# Patient Record
Sex: MALE | Race: WHITE | Employment: FULL TIME | URBAN - METROPOLITAN AREA
[De-identification: names, ages, dates, MRNs, and addresses within clinical notes are randomized per-mention and may not be internally consistent; named-entity substitution may affect disease eponyms.]

---
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
## 2021-09-06 ENCOUNTER — HOSPITAL ENCOUNTER (EMERGENCY)
Age: 21
Discharge: HOME | End: 2021-09-06
Payer: COMMERCIAL

## 2021-09-06 VITALS
RESPIRATION RATE: 16 BRPM | OXYGEN SATURATION: 98 % | TEMPERATURE: 98.1 F | DIASTOLIC BLOOD PRESSURE: 98 MMHG | HEART RATE: 78 BPM | SYSTOLIC BLOOD PRESSURE: 157 MMHG

## 2021-09-06 DIAGNOSIS — S61.213A LACERATION OF LEFT MIDDLE FINGER WITHOUT FOREIGN BODY WITHOUT DAMAGE TO NAIL, INITIAL ENCOUNTER: Primary | ICD-10-CM

## 2021-09-06 PROCEDURE — 12001 RPR S/N/AX/GEN/TRNK 2.5CM/<: CPT

## 2021-09-06 PROCEDURE — 99282 EMERGENCY DEPT VISIT SF MDM: CPT

## 2021-09-06 PROCEDURE — 2500000003 HC RX 250 WO HCPCS: Performed by: PHYSICIAN ASSISTANT

## 2021-09-06 RX ORDER — LIDOCAINE HYDROCHLORIDE 10 MG/ML
5 INJECTION, SOLUTION EPIDURAL; INFILTRATION; INTRACAUDAL; PERINEURAL ONCE
Status: COMPLETED | OUTPATIENT
Start: 2021-09-06 | End: 2021-09-06

## 2021-09-06 RX ORDER — BACITRACIN ZINC AND POLYMYXIN B SULFATE 500; 1000 [USP'U]/G; [USP'U]/G
OINTMENT TOPICAL ONCE
Status: DISCONTINUED | OUTPATIENT
Start: 2021-09-06 | End: 2021-09-06 | Stop reason: HOSPADM

## 2021-09-06 RX ADMIN — LIDOCAINE HYDROCHLORIDE 5 ML: 10 INJECTION, SOLUTION EPIDURAL; INFILTRATION; INTRACAUDAL; PERINEURAL at 12:48

## 2021-09-06 ASSESSMENT — ENCOUNTER SYMPTOMS
BACK PAIN: 0
RESPIRATORY NEGATIVE: 1
GASTROINTESTINAL NEGATIVE: 1

## 2021-09-06 ASSESSMENT — PAIN SCALES - GENERAL: PAINLEVEL_OUTOF10: 6

## 2021-09-06 ASSESSMENT — PAIN DESCRIPTION - PAIN TYPE: TYPE: ACUTE PAIN

## 2021-09-06 ASSESSMENT — PAIN DESCRIPTION - LOCATION: LOCATION: FINGER (COMMENT WHICH ONE)

## 2021-09-06 ASSESSMENT — PAIN DESCRIPTION - ORIENTATION: ORIENTATION: LEFT

## 2021-09-06 NOTE — ED NOTES
Pt discharged to home. Pt given discharge instructions and verbalized understanding. Pt ambulatory at discharge and left without incident.       Sammy Mendez RN  09/06/21 7739

## 2021-09-06 NOTE — ED PROVIDER NOTES
[facility]          PHYSICIAN ASSISTANT NOTE       Date of evaluation: 9/6/2021    Chief Complaint     Laceration      History of Present Illness     Gerson Sapp is a 22 y.o. male who presents to the emergency department with a left middle finger laceration. The patient was at work cutting a lemon when he accidentally cut his left middle finger on a knife. He is right-handed dominant denies numbness or tingling of the extremity. He does have some throbbing that he rates a 5-6 out of 10. He is able to move through full range of motion without difficulty. He states his tetanus vaccination is up-to-date. Review of Systems     Review of Systems   Constitutional: Negative for chills and fever. HENT: Negative. Respiratory: Negative. Cardiovascular: Negative. Gastrointestinal: Negative. Genitourinary: Negative. Musculoskeletal: Negative for back pain and neck pain. Positive for left middle finger pain and injury   Skin: Positive for wound. Positive for a left middle finger laceration   Neurological: Negative. Negative for dizziness, weakness, light-headedness, numbness and headaches. All other systems reviewed and are negative. Past Medical, Surgical, Family, and Social History     He has a past medical history of Fractures. He has a past surgical history that includes [place] tooth extraction. His family history is not on file. He reports that he has never smoked. He has never used smokeless tobacco. He reports that he does not drink alcohol and does not use drugs.     Medications     Current Discharge Medication List      CONTINUE these medications which have NOT CHANGED    Details   ondansetron (ZOFRAN ODT) 4 MG disintegrating tablet Take 1 tablet by mouth every 8 hours as needed for Nausea or Vomiting  Qty: 10 tablet, Refills: 0      dicyclomine (BENTYL) 10 MG capsule Take 1 capsule by mouth 4 times daily as needed (abdominal pain)  Qty: 12 capsule, Refills: 0             Allergies     He is allergic to amoxicillin. Physical Exam     INITIAL VITALS: BP: (!) 157/98, Temp: 98.1 °F (36.7 °C), Pulse: 78, Resp: 16, SpO2: 98 %  Physical Exam  Vitals and nursing note reviewed. Constitutional:       General: He is not in acute distress. Appearance: He is well-developed. HENT:      Head: Normocephalic and atraumatic. Right Ear: External ear normal.      Left Ear: External ear normal.      Nose: Nose normal.   Eyes:      General:         Right eye: No discharge. Left eye: No discharge. Pulmonary:      Effort: Pulmonary effort is normal.   Musculoskeletal:         General: Tenderness and signs of injury present. Normal range of motion. Cervical back: Normal range of motion and neck supple. Comments: Distal pulses 2+ bilaterally of upper and lower extremities. He is moving all extremities without difficulty. On examination of the left upper extremity he has 2+ radial and ulnar pulses with intact radial, median and ulnar nerves to motor and sensory function. On examination of the left middle finger he has an symmetrically 1 cm laceration on the radial aspect of the distal phalanx that stops just at the nail. There is no injury through the nail or nailbed. He is able to fully flex and extend the digit without difficulty. Brisk capillary refill   Skin:     General: Skin is warm and dry. Capillary Refill: Capillary refill takes less than 2 seconds. Neurological:      General: No focal deficit present. Mental Status: He is alert and oriented to person, place, and time. Sensory: No sensory deficit. Deep Tendon Reflexes: Reflexes normal.      Comments: 5 out of 5 strength of bilateral upper and lower extremities. Intact sensation throughout.   He is ambulating without difficulty   Psychiatric:         Mood and Affect: Mood normal.         Behavior: Behavior normal.         Thought Content: bacitracin-polymyxin b (POLYSPORIN) ointment       CONSULTS:  None    MEDICAL DECISION MAKING / ASSESSMENT / PLAN     Melodie Taylor is a 22 y.o. male who presented to the emergency department with a left middle finger laceration. On examination he is neurovascularly intact. 5 out of 5 strength of bilateral upper and lower extremities. No foreign bodies were noted. The patient cut his finger with a kitchen knife and given that he is neurovascularly intact, has full range of motion I do not imaging necessary at this time. I do not appreciate any obvious foreign body. His tetanus vaccination is up-to-date. He had his anger anesthetized, had the wound irrigated and closed with sutures. He will be discharged home with wound care instructions. He is to follow-up with his primary care physician in 7 to 8 days for wound check and suture removal.  He is return to the emergency department for worsening symptoms or signs of infection    This patient was seen independently by the advanced practice provider    Clinical Impression     1.  Laceration of left middle finger without foreign body without damage to nail, initial encounter        Disposition     PATIENT REFERRED TO:  your PCP or PCP of your choice from list provided            DISCHARGE MEDICATIONS:  Current Discharge Medication List          DISPOSITION Decision To Discharge 09/06/2021 12:13:24 PM     SHRADDHA Robles  09/06/21 [place] [facility]  09/06/21 7497

## 2021-10-14 ENCOUNTER — HOSPITAL ENCOUNTER (EMERGENCY)
Age: 21
Discharge: HOME | End: 2021-10-14
Attending: EMERGENCY MEDICINE
Payer: COMMERCIAL

## 2021-10-14 VITALS
RESPIRATION RATE: 16 BRPM | WEIGHT: 225 LBS | TEMPERATURE: 98.5 F | SYSTOLIC BLOOD PRESSURE: 156 MMHG | BODY MASS INDEX: 33.33 KG/M2 | HEIGHT: 69 IN | HEART RATE: 76 BPM | OXYGEN SATURATION: 99 % | DIASTOLIC BLOOD PRESSURE: 93 MMHG

## 2021-10-14 DIAGNOSIS — Z20.2 POSSIBLE EXPOSURE TO STD: Primary | ICD-10-CM

## 2021-10-14 PROCEDURE — 86702 HIV-2 ANTIBODY: CPT

## 2021-10-14 PROCEDURE — 87390 HIV-1 AG IA: CPT

## 2021-10-14 PROCEDURE — 86701 HIV-1ANTIBODY: CPT

## 2021-10-14 PROCEDURE — 86780 TREPONEMA PALLIDUM: CPT

## 2021-10-14 PROCEDURE — 99282 EMERGENCY DEPT VISIT SF MDM: CPT

## 2021-10-14 NOTE — ED NOTES
Pt discharged with written instructions.  Pt verbalizes understanding and walked to lobby     [employer], RN  10/14/21 5832

## 2021-10-14 NOTE — ED PROVIDER NOTES
Date of evaluation: 10/14/2021    Chief Complaint   Exposure to STD (Female partner told him that she was being tested for HPV, possible warts. Pt states he doesn't have symptoms. )      Nursing Notes, Past Medical Hx, Past Surgical Hx, Social Hx, Allergies, and Family Hx were reviewed. History of Present Illness     William Cook is a 22 y.o. male who presents with concerns of an STD exposure. One of his sexual partners told him that she is being evaluated for HPV/genital warts. He had unprotected sex 1 week ago with this partner. He is currently not having any symptoms. He denies fever, skin lesions, swollen lymph nodes, oral lesions, penile pain, dysuria, hematuria, scrotal pain or swelling, abdominal pain, sore throat or flulike symptoms. Review of Systems     Review of Systems   All other systems reviewed and are negative. He denies fever, skin lesions, swollen lymph nodes, oral lesions, penile pain, dysuria, hematuria, scrotal pain or swelling, abdominal pain, sore throat or flulike symptoms. Past Medical, Surgical, Family, and Social History     He has a past medical history of Fractures. He has a past surgical history that includes [place] tooth extraction. His family history is not on file. He reports that he has never smoked. He has never used smokeless tobacco. He reports that he does not drink alcohol and does not use drugs. Medications     Discharge Medication List as of 10/14/2021  8:33 AM          Allergies     He is allergic to amoxicillin. Physical Exam     INITIAL VITALS: BP (!) 156/93   Pulse 76   Temp 98.5 °F (36.9 °C) (Oral)   Resp 16   Ht 5' 9\" (1.753 m)   Wt 225 lb (102.1 kg)   SpO2 99%   BMI 33.23 kg/m²    Physical Exam  Vitals and nursing note reviewed. Constitutional:       Appearance: Normal appearance. He is not ill-appearing, toxic-appearing or diaphoretic. HENT:      Head: Normocephalic and atraumatic.       Mouth/Throat:      Mouth: Mucous membranes are moist.   Eyes:      Conjunctiva/sclera: Conjunctivae normal.   Cardiovascular:      Rate and Rhythm: Normal rate and regular rhythm. Pulses: Normal pulses. Heart sounds: Normal heart sounds. Pulmonary:      Effort: Pulmonary effort is normal.      Breath sounds: Normal breath sounds. Abdominal:      General: There is no distension. Palpations: Abdomen is soft. Tenderness: There is no abdominal tenderness. Genitourinary:     Penis: Normal.       Testes: Normal.      Comments: No skin lesions, no scrotal or testicular tenderness, no penile discharge  Musculoskeletal:         General: Normal range of motion. Cervical back: Neck supple. Lymphadenopathy:      Cervical: No cervical adenopathy. Skin:     General: Skin is warm and dry. Capillary Refill: Capillary refill takes less than 2 seconds. Findings: No lesion or rash. Neurological:      General: No focal deficit present. Mental Status: He is alert and oriented to person, place, and time. Psychiatric:         Behavior: Behavior normal.         Diagnostic Results       RADIOLOGY:  No orders to display       LABS:   Labs Reviewed   C. TRACHOMATIS / N. GONORRHOEAE, DNA   HIV SCREEN   SYPHILIS ANTIBODY CASCADING REFLEX         VITALS:  BP: (!) 156/93, Temp: 98.5 °F (36.9 °C), Pulse: 76, Resp: 16     Procedures         ED Course     The patient was given the followingmedications:  No orders of the defined types were placed in this encounter. CONSULTS:  None    MEDICAL DECISION MAKING     Nicole Campo is a 22 y.o. male presenting with concerns of for exposure to HPV after having unprotected sex. He is currently asymptomatic. He has no skin lesions to suggest HPV infection. Safe sex practices were advised. He is agreeable to testing for gonorrhea, chlamydia, syphilis and HIV. I advised him that he will need repeat HIV testing given the timing of his presentation.     Discharge instructions including strict return precautions were given to the patient. All questions were addressed. The patient verbalizes understanding and is in agreement with the plan. Clinical Impression     1.  Possible exposure to STD        /Plan     PATIENT REFERRED TO:  Eddie Guadalupe MD  [address] [address]  [phone]            DISCHARGE MEDICATIONS:  Discharge Medication List as of 10/14/2021  8:33 AM          DISPOSITION Decision To Discharge 10/14/2021 08:04:54 AM        Nakia Torres MD  10/14/21 1618

## 2021-10-14 NOTE — Clinical Note
Katerina Fields was seen and treated in our emergency department on 10/14/2021. He may return to work on 10/14/2021. If you have any questions or concerns, please don't hesitate to call.       Sharlene Cleveland MD

## 2021-10-15 LAB
HIV AG/AB: NORMAL
HIV ANTIGEN: NORMAL
HIV-1 ANTIBODY: NORMAL
HIV-2 AB: NORMAL
TOTAL SYPHILLIS IGG/IGM: NORMAL

## 2022-02-27 ENCOUNTER — HOSPITAL ENCOUNTER (EMERGENCY)
Age: 22
Discharge: HOME | End: 2022-02-27
Attending: EMERGENCY MEDICINE
Payer: COMMERCIAL

## 2022-02-27 VITALS
OXYGEN SATURATION: 100 % | DIASTOLIC BLOOD PRESSURE: 89 MMHG | SYSTOLIC BLOOD PRESSURE: 115 MMHG | WEIGHT: 220 LBS | HEIGHT: 69 IN | BODY MASS INDEX: 32.58 KG/M2 | RESPIRATION RATE: 16 BRPM | TEMPERATURE: 98.2 F | HEART RATE: 62 BPM

## 2022-02-27 DIAGNOSIS — R51.9 ACUTE NONINTRACTABLE HEADACHE, UNSPECIFIED HEADACHE TYPE: Primary | ICD-10-CM

## 2022-02-27 PROCEDURE — 96374 THER/PROPH/DIAG INJ IV PUSH: CPT

## 2022-02-27 PROCEDURE — 99283 EMERGENCY DEPT VISIT LOW MDM: CPT

## 2022-02-27 PROCEDURE — 6360000002 HC RX W HCPCS: Performed by: EMERGENCY MEDICINE

## 2022-02-27 PROCEDURE — 2580000003 HC RX 258: Performed by: EMERGENCY MEDICINE

## 2022-02-27 PROCEDURE — 96375 TX/PRO/DX INJ NEW DRUG ADDON: CPT

## 2022-02-27 RX ORDER — DIPHENHYDRAMINE HYDROCHLORIDE 50 MG/ML
25 INJECTION INTRAMUSCULAR; INTRAVENOUS ONCE
Status: COMPLETED | OUTPATIENT
Start: 2022-02-27 | End: 2022-02-27

## 2022-02-27 RX ORDER — KETOROLAC TROMETHAMINE 30 MG/ML
15 INJECTION, SOLUTION INTRAMUSCULAR; INTRAVENOUS ONCE
Status: COMPLETED | OUTPATIENT
Start: 2022-02-27 | End: 2022-02-27

## 2022-02-27 RX ORDER — PROCHLORPERAZINE EDISYLATE 5 MG/ML
10 INJECTION INTRAMUSCULAR; INTRAVENOUS ONCE
Status: COMPLETED | OUTPATIENT
Start: 2022-02-27 | End: 2022-02-27

## 2022-02-27 RX ORDER — 0.9 % SODIUM CHLORIDE 0.9 %
1000 INTRAVENOUS SOLUTION INTRAVENOUS ONCE
Status: COMPLETED | OUTPATIENT
Start: 2022-02-27 | End: 2022-02-27

## 2022-02-27 RX ADMIN — PROCHLORPERAZINE EDISYLATE 10 MG: 5 INJECTION INTRAMUSCULAR; INTRAVENOUS at 12:12

## 2022-02-27 RX ADMIN — DIPHENHYDRAMINE HYDROCHLORIDE 25 MG: 50 INJECTION, SOLUTION INTRAMUSCULAR; INTRAVENOUS at 12:12

## 2022-02-27 RX ADMIN — SODIUM CHLORIDE 1000 ML: 9 INJECTION, SOLUTION INTRAVENOUS at 12:11

## 2022-02-27 RX ADMIN — KETOROLAC TROMETHAMINE 15 MG: 30 INJECTION, SOLUTION INTRAMUSCULAR at 12:12

## 2022-02-27 ASSESSMENT — PAIN DESCRIPTION - DESCRIPTORS: DESCRIPTORS: THROBBING

## 2022-02-27 ASSESSMENT — PAIN SCALES - GENERAL
PAINLEVEL_OUTOF10: 8
PAINLEVEL_OUTOF10: 8

## 2022-02-27 ASSESSMENT — PAIN DESCRIPTION - LOCATION: LOCATION: HEAD

## 2022-02-27 ASSESSMENT — PAIN - FUNCTIONAL ASSESSMENT: PAIN_FUNCTIONAL_ASSESSMENT: 0-10

## 2022-02-27 ASSESSMENT — PAIN DESCRIPTION - FREQUENCY: FREQUENCY: CONTINUOUS

## 2022-02-27 ASSESSMENT — PAIN DESCRIPTION - ORIENTATION: ORIENTATION: LEFT

## 2022-02-27 ASSESSMENT — PAIN DESCRIPTION - PAIN TYPE: TYPE: ACUTE PAIN

## 2022-03-01 NOTE — ED PROVIDER NOTES
[facility]  ED      CHIEF COMPLAINT  Headache (patient with left temple pain since Tuesday, uncontrolled with over the counter meds. Patient describes as throbbing, constant pain. Patient denies vision changes, numbness or tingling in any extremeties. Family hx of migraines.)       HISTORY OF PRESENT ILLNESS  Yessi Rodas is a 25 y.o. male  who presents to the ED complaining of headache. The patient states that he has had a headache since this past Tuesday. He states it feels like his prior headaches but has lasted longer than usual.  He states it was gradual onset, describes it on the left side of his head as a throbbing pain. He describes phonophobia. He states it improves when he wakes up in the morning but then worsens again at night. He has been taking over-the-counter medications without any relief which brought him in today. He denies vision changes, dizziness, or focal numbness or weakness. He denies any vomiting has had some nausea. He denies any recent injuries. Denies fever chills. No other complaints, modifying factors or associated symptoms. I have reviewed the following from the nursing documentation. Past Medical History:   Diagnosis Date    Fractures      Past Surgical History:   Procedure Laterality Date    WISDOM TOOTH EXTRACTION       No family history on file.   Social History     Socioeconomic History    Marital status: Single     Spouse name: Not on file    Number of children: Not on file    Years of education: Not on file    Highest education level: Not on file   Occupational History    Not on file   Tobacco Use    Smoking status: Never Smoker    Smokeless tobacco: Never Used   Vaping Use    Vaping Use: Never used   Substance and Sexual Activity    Alcohol use: No    Drug use: Yes     Frequency: 3.0 times per week     Types: Marijuana Veldon Bunting)    Sexual activity: Not on file   Other Topics Concern    Not on file   Social History Narrative    Not on file     Social Determinants of Health     Financial Resource Strain:     Difficulty of Paying Living Expenses: Not on file   Food Insecurity:     Worried About Running Out of Food in the Last Year: Not on file    Alex of Food in the Last Year: Not on file   Transportation Needs:     Lack of Transportation (Medical): Not on file    Lack of Transportation (Non-Medical): Not on file   Physical Activity:     Days of Exercise per Week: Not on file    Minutes of Exercise per Session: Not on file   Stress:     Feeling of Stress : Not on file   Social Connections:     Frequency of Communication with Friends and Family: Not on file    Frequency of Social Gatherings with Friends and Family: Not on file    Attends Buddhist Services: Not on file    Active Member of [address] [place] [address] [employer] or Organizations: Not on file    Attends Club or Organization Meetings: Not on file    Marital Status: Not on file   Intimate Partner Violence:     Fear of Current or Ex-Partner: Not on file    Emotionally Abused: Not on file    Physically Abused: Not on file    Sexually Abused: Not on file   Housing Stability:     Unable to Pay for Housing in the Last Year: Not on file    Number of [place] in the Last Year: Not on file    Unstable Housing in the Last Year: Not on file     No current facility-administered medications for this encounter. No current outpatient medications on file. Allergies   Allergen Reactions    Amoxicillin Rash       REVIEW OF SYSTEMS  10 systems reviewed, pertinent positives per HPI otherwise noted to be negative. PHYSICAL EXAM  /89   Pulse 62   Temp 98.2 °F (36.8 °C) (Oral)   Resp 16   Ht 5' 9\" (1.753 m)   Wt 220 lb (99.8 kg)   SpO2 100%   BMI 32.49 kg/m²    Physical exam:  General appearance: awake and cooperative. no distress. Non toxic appearing. Skin: Warm and dry. No rashes or lesions. HENT: Normocephalic. Atraumatic. Mucus membranes are moist.   Neck: supple. Normal ROM.  No temperature source Oral, resp. rate 16, height 5' 9\" (1.753 m), weight 220 lb (99.8 kg), SpO2 100 %. Patient was given scripts for the following medications. I counseled patient how to take these medications. There are no discharge medications for this patient. Follow-up with:  Ayaan Rodriguez MD  [address] [place] [address]  [phone]    Call in 1 day        DISCLAIMER: This chart was created using Dragon dictation software. Efforts were made by me to ensure accuracy, however some errors may be present due to limitations of this technology and occasionally words are not transcribed correctly.        Dominick [place]  03/01/22 6734

## 2022-03-27 ENCOUNTER — HOSPITAL ENCOUNTER (EMERGENCY)
Age: 22
Discharge: HOME | End: 2022-03-27
Attending: STUDENT IN AN ORGANIZED HEALTH CARE EDUCATION/TRAINING PROGRAM
Payer: COMMERCIAL

## 2022-03-27 ENCOUNTER — APPOINTMENT (OUTPATIENT)
Dept: GENERAL RADIOLOGY | Age: 22
End: 2022-03-27
Payer: COMMERCIAL

## 2022-03-27 VITALS
RESPIRATION RATE: 18 BRPM | DIASTOLIC BLOOD PRESSURE: 94 MMHG | HEART RATE: 66 BPM | WEIGHT: 220 LBS | SYSTOLIC BLOOD PRESSURE: 153 MMHG | TEMPERATURE: 97.9 F | OXYGEN SATURATION: 99 % | HEIGHT: 69 IN | BODY MASS INDEX: 32.58 KG/M2

## 2022-03-27 DIAGNOSIS — S61.219A LACERATION OF FINGER OF RIGHT HAND, INITIAL ENCOUNTER: Primary | ICD-10-CM

## 2022-03-27 PROCEDURE — 90715 TDAP VACCINE 7 YRS/> IM: CPT | Performed by: STUDENT IN AN ORGANIZED HEALTH CARE EDUCATION/TRAINING PROGRAM

## 2022-03-27 PROCEDURE — 12002 RPR S/N/AX/GEN/TRNK2.6-7.5CM: CPT

## 2022-03-27 PROCEDURE — 6360000002 HC RX W HCPCS: Performed by: STUDENT IN AN ORGANIZED HEALTH CARE EDUCATION/TRAINING PROGRAM

## 2022-03-27 PROCEDURE — 2500000003 HC RX 250 WO HCPCS: Performed by: STUDENT IN AN ORGANIZED HEALTH CARE EDUCATION/TRAINING PROGRAM

## 2022-03-27 PROCEDURE — 99282 EMERGENCY DEPT VISIT SF MDM: CPT

## 2022-03-27 PROCEDURE — 73130 X-RAY EXAM OF HAND: CPT

## 2022-03-27 PROCEDURE — 90471 IMMUNIZATION ADMIN: CPT | Performed by: STUDENT IN AN ORGANIZED HEALTH CARE EDUCATION/TRAINING PROGRAM

## 2022-03-27 RX ORDER — DOXYCYCLINE HYCLATE 100 MG
100 TABLET ORAL 2 TIMES DAILY
Qty: 10 TABLET | Refills: 0 | Status: SHIPPED | OUTPATIENT
Start: 2022-03-27 | End: 2022-04-01

## 2022-03-27 RX ADMIN — TETANUS TOXOID, REDUCED DIPHTHERIA TOXOID AND ACELLULAR PERTUSSIS VACCINE, ADSORBED 0.5 ML: 5; 2.5; 8; 8; 2.5 SUSPENSION INTRAMUSCULAR at 11:36

## 2022-03-27 RX ADMIN — LIDOCAINE HYDROCHLORIDE 5 ML: 10 INJECTION, SOLUTION EPIDURAL; INFILTRATION; INTRACAUDAL; PERINEURAL at 11:37

## 2022-03-27 ASSESSMENT — PAIN SCALES - GENERAL: PAINLEVEL_OUTOF10: 4

## 2022-03-27 NOTE — ED PROVIDER NOTES
[address] [facility]          ATTENDING PHYSICIAN NOTE       Date of evaluation: 3/27/2022    Chief Complaint     Laceration (right hand index finger-cut hand cutting bread about an hour ago)      History of Present Illness     Drew Dixon is a 25 y.o. male who presents chief complaint of a laceration to his right index finger. Patient states incident occurred approximately 45 minutes ago, states excellently comes up with a knife. He is unsure when his last tetanus was. Complains of a sharp pain over the aspect of that finger. Denies any weakness or numbness. He is able to fully flex and extend that finger. [place] cuts to any other area. Movement and palpation make his pain worse, rest seems to improve his pain. Review of Systems     REVIEW OF SYSTEMS  GENERAL: Negative for any fevers, chills, or weight loss. HEENT: Negative for any head trauma, neck trauma, neck stiffness, photophobia, phonophobia, sinusitis, rhinitis. CARDIAC: Negative for any chest pain, palpitations  PULMONARY: Negative for any shortness of breath, cough, wheezing  GASTROINTESTINAL: Negative for any abdominal pain, nausea, vomiting,   GENITOURINARY: Negative for any dysuria, hematuria, incontinence. SKIN: Per HPI  MSK: Negative for any joint pains, back pain  HEMATOLOGIC: Negative for any abnormal bruising, frequent infections or bleeding. NEUROLOGIC: Negative for any headache, dizziness, focal weakness  PSYCH: Negative for any agitation, confusion      Past Medical, Surgical, Family, and Social History     He has a past medical history of Fractures. He has a past surgical history that includes [place] tooth extraction. His family history is not on file. He reports that he has never smoked. He has never used smokeless tobacco. He reports current alcohol use. He reports current drug use. Frequency: 3.00 times per week. Drug: Marijuana Micah Sanchez).     Medications     Previous Medications    No medications on file       Allergies     He is allergic to amoxicillin. Physical Exam     INITIAL VITALS: BP: (!) 153/94, Temp: 97.9 °F (36.6 °C), Pulse: 66, Resp: 18, SpO2: 99 %   Physical Exam  Constitutional:       Appearance: Normal appearance. HENT:      Head: Normocephalic and atraumatic. Eyes:      Conjunctiva/sclera: Conjunctivae normal.      Pupils: Pupils are equal, round, and reactive to light. Cardiovascular:      Rate and Rhythm: Normal rate and regular rhythm. Pulses: Normal pulses. Pulmonary:      Effort: Pulmonary effort is normal.      Breath sounds: Normal breath sounds. Abdominal:      General: Abdomen is flat. Palpations: Abdomen is soft. Musculoskeletal:         General: No swelling. Normal range of motion. Cervical back: Normal range of motion and neck supple. Skin:     Comments: 3 and half centimeter laceration along the proximal phalanx on the palmar aspect of the first digit. Patient able to fully extend finger, able to fully flex both FDP and FDS. Sensation intact, brisk capillary refill. No other appreciable lacerations. No appreciable foreign bodies   Neurological:      General: No focal deficit present. Mental Status: He is alert. Mental status is at baseline. Psychiatric:         Mood and Affect: Mood normal.         Behavior: Behavior normal.         Diagnostic Results     EKG       RADIOLOGY:  XR HAND RIGHT (MIN 3 VIEWS)   Final Result      No acute fracture seen. LABS:   No results found for this visit on 03/27/22.     ED BEDSIDE ULTRASOUND:      RECENT VITALS:  BP: (!) 153/94,Temp: 97.9 °F (36.6 °C), Pulse: 66, Resp: 18, SpO2: 99 %     Procedures     Lac Repair    Date/Time: 3/27/2022 12:19 PM  Performed by: Fidelia Ludwig MD  Authorized by: Fidelia Ludwig MD     Consent:     Consent obtained:  Verbal    Risks discussed:  Infection, pain and poor cosmetic result    Alternatives discussed:  No treatment and delayed treatment  Anesthesia (see MAR for exact dosages): Anesthesia method:  Local infiltration    Local anesthetic:  Lidocaine 1% w/o epi  Laceration details:     Location:  Finger    Finger location:  R index finger    Length (cm):  4.5    Depth (mm):  0.5  Repair type:     Repair type:  Simple  Pre-procedure details:     Preparation:  Patient was prepped and draped in usual sterile fashion  Exploration:     Hemostasis achieved with:  Direct pressure    Wound extent: no fascia violation noted    Treatment:     Area cleansed with:  Saline and Shur-Clens    Amount of cleaning:  Extensive    Irrigation solution:  Sterile saline    Irrigation method:  Pressure wash  Skin repair:     Repair method:  Sutures    Suture size:  4-0    Suture material:  Prolene  Approximation:     Approximation:  Close  Post-procedure details:     Dressing:  Non-adherent dressing          ED Course     Nursing Notes, Past Medical Hx, Past Surgical Hx, Social Hx,Allergies, and Family Hx were reviewed. patient was given the following medications:  Orders Placed This Encounter   Medications    Tetanus-Diphth-Acell Pertussis (BOOSTRIX) injection 0.5 mL    lidocaine 1 % injection 5 mL       CONSULTS:  None    MEDICAL DECISIONMAKING / ASSESSMENT / Edwin Jaron is a 25 y.o. male presenting with chief complaint of a laceration of his right hand. Patient with approximately 4 and half centimeter laceration over the palmar aspect of the right proximal phalanx. Patient with full flexion and extension. Neurovascular intact, repaired as described above, x-ray without appreciable fracture or foreign body. Is updated. Wound thoroughly irrigated however patient is right-handed states knife was slightly dirty so will discharge with prophylactic antibiotics. Strict return precautions given. All questions answered properly. Patient agrees with plan moving forward    Clinical Impression     1.  Laceration of finger of right hand, initial encounter Disposition     PATIENT REFERRED TO:  Jena Cee MD  [address] [address]  [phone]    In 1 week  (Or to this ED) For suture removal      DISCHARGE MEDICATIONS:  New Prescriptions    DOXYCYCLINE HYCLATE (VIBRA-TABS) 100 MG TABLET    Take 1 tablet by mouth 2 times daily for 5 days       Cherrie Al MD  03/27/22 9434

## 2022-03-27 NOTE — ED NOTES
Patient prepared for and ready to be discharged. Patient discharged at this time in no acute distress after verbalizing understanding of discharge instructions. Patient left after receiving After Visit Summary instructions.       Mauro Rosenthal RN  03/27/22 9509

## 2022-06-27 ENCOUNTER — HOSPITAL ENCOUNTER (EMERGENCY)
Age: 22
Discharge: HOME | End: 2022-06-27
Attending: EMERGENCY MEDICINE
Payer: COMMERCIAL

## 2022-06-27 VITALS
BODY MASS INDEX: 32.58 KG/M2 | DIASTOLIC BLOOD PRESSURE: 73 MMHG | WEIGHT: 220 LBS | RESPIRATION RATE: 14 BRPM | SYSTOLIC BLOOD PRESSURE: 158 MMHG | OXYGEN SATURATION: 98 % | TEMPERATURE: 98.1 F | HEIGHT: 69 IN | HEART RATE: 81 BPM

## 2022-06-27 DIAGNOSIS — L30.9 DERMATITIS: Primary | ICD-10-CM

## 2022-06-27 PROCEDURE — 6370000000 HC RX 637 (ALT 250 FOR IP): Performed by: EMERGENCY MEDICINE

## 2022-06-27 PROCEDURE — 99283 EMERGENCY DEPT VISIT LOW MDM: CPT

## 2022-06-27 RX ORDER — CETIRIZINE HYDROCHLORIDE 10 MG/1
10 TABLET ORAL DAILY
Qty: 30 TABLET | Refills: 0 | Status: SHIPPED | OUTPATIENT
Start: 2022-06-27 | End: 2022-07-27

## 2022-06-27 RX ORDER — PREDNISONE 20 MG/1
TABLET ORAL
Qty: 18 TABLET | Refills: 0 | Status: SHIPPED | OUTPATIENT
Start: 2022-06-27 | End: 2022-07-07

## 2022-06-27 RX ORDER — DIPHENHYDRAMINE HCL 25 MG
25 TABLET ORAL ONCE
Status: COMPLETED | OUTPATIENT
Start: 2022-06-27 | End: 2022-06-27

## 2022-06-27 RX ORDER — PREDNISONE 20 MG/1
60 TABLET ORAL ONCE
Status: COMPLETED | OUTPATIENT
Start: 2022-06-27 | End: 2022-06-27

## 2022-06-27 RX ADMIN — PREDNISONE 60 MG: 20 TABLET ORAL at 02:40

## 2022-06-27 RX ADMIN — DIPHENHYDRAMINE HCL 25 MG: 25 TABLET ORAL at 02:41

## 2022-06-27 ASSESSMENT — PAIN - FUNCTIONAL ASSESSMENT: PAIN_FUNCTIONAL_ASSESSMENT: 0-10

## 2022-06-27 ASSESSMENT — PAIN DESCRIPTION - PAIN TYPE: TYPE: ACUTE PAIN

## 2022-06-27 ASSESSMENT — PAIN DESCRIPTION - DESCRIPTORS: DESCRIPTORS: DISCOMFORT

## 2022-06-27 ASSESSMENT — PAIN DESCRIPTION - LOCATION: LOCATION: GENERALIZED

## 2022-06-27 ASSESSMENT — PAIN SCALES - GENERAL: PAINLEVEL_OUTOF10: 7

## 2022-06-27 NOTE — ED NOTES
Discharge instructions explained by ED provider. Patient verbalized understanding and denies any other concerns or complaints at this time. Patient vital signs stable and no acute signs or symptoms of distress noted at discharge. Patient deemed clinically stable. Patient d/c home with family.      Esperanza Gerber RN  06/27/22 5216

## 2022-06-29 ASSESSMENT — ENCOUNTER SYMPTOMS
SORE THROAT: 0
WHEEZING: 0
SHORTNESS OF BREATH: 0
TROUBLE SWALLOWING: 0

## 2022-06-29 NOTE — ED PROVIDER NOTES
[facility]  ED  EMERGENCY DEPARTMENTENCOUNTER      Pt Name: Santi Leach  MRN: [id number]  [place] 2000  Date ofevaluation: 6/27/2022  Provider: Juventino Denise MD    CHIEF COMPLAINT       Chief Complaint   Patient presents with   Charl Riedel went camping friday now has poison ivy in MANY places         HISTORY OF PRESENT ILLNESS   (Location/Symptom, Timing/Onset,Context/Setting, Quality, Duration, Modifying Factors, Severity)  Note limiting factors. Santi Leach is a 25 y.o. male  who  has a past medical history of Fractures. who presents to the emergency department for evaluation of rash. Patient reports that he recently camping and thinks he may have come in contact with poison ivy. He has a pruritic erythematous rash all over his upper EXTR and lower extremities. Denies wheezing fevers or difficulties breathing. He has been using topical medications without improvement. Denies fevers. HPI    NursingNotes were reviewed. REVIEW OF SYSTEMS    (2-9 systems for level 4, 10 or more for level 5)     Review of Systems   Constitutional: Negative for fever. HENT: Negative for sore throat and trouble swallowing. Respiratory: Negative for shortness of breath and wheezing. Skin: Positive for rash. Except as noted above the remainder of the review of systems was reviewed and negative. PAST MEDICAL HISTORY     Past Medical History:   Diagnosis Date    Fractures          SURGICALHISTORY       Past Surgical History:   Procedure Laterality Date    WISDOM TOOTH EXTRACTION           CURRENT MEDICATIONS       Discharge Medication List as of 6/27/2022  2:35 AM               Amoxicillin    FAMILY HISTORY     History reviewed. No pertinent family history.        SOCIAL HISTORY       Social History     Socioeconomic History    Marital status: Single     Spouse name: None    Number of children: None    Years of education: None    Highest education level: None   Occupational History    None   Tobacco Use    Smoking status: Never Smoker    Smokeless tobacco: Never Used   Vaping Use    Vaping Use: Never used   Substance and Sexual Activity    Alcohol use: Yes     Comment: occ    Drug use: Yes     Frequency: 3.0 times per week     Types: Marijuana [place])    Sexual activity: None   Other Topics Concern    None   Social History Narrative    None     Social Determinants of Health     Financial Resource Strain:     Difficulty of Paying Living Expenses: Not on file   Food Insecurity:     Worried About Running Out of Food in the Last Year: Not on file    Alex of Food in the Last Year: Not on file   Transportation Needs:     Lack of Transportation (Medical): Not on file    Lack of Transportation (Non-Medical):  Not on file   Physical Activity:     Days of Exercise per Week: Not on file    Minutes of Exercise per Session: Not on file   Stress:     Feeling of Stress : Not on file   Social Connections:     Frequency of Communication with Friends and Family: Not on file    Frequency of Social Gatherings with Friends and Family: Not on file    Attends Muslim Services: Not on file    Active Member of [address] [place] [address] or Organizations: Not on file    Attends Club or Organization Meetings: Not on file    Marital Status: Not on file   Intimate Partner Violence:     Fear of Current or Ex-Partner: Not on file    Emotionally Abused: Not on file    Physically Abused: Not on file    Sexually Abused: Not on file   Housing Stability:     Unable to Pay for Housing in the Last Year: Not on file    Number of [place] in the Last Year: Not on file    Unstable Housing in the Last Year: Not on file       SCREENINGS    Helio Coma Scale  Eye Opening: Spontaneous  Best Verbal Response: Oriented  Best Motor Response: Obeys commands  Helio Coma Scale Score: 15        PHYSICAL EXAM    (up to 7 for level 4, 8 or more for level 5)     ED Triage Vitals [06/27/22 0144]   BP Temp Temp Source Heart Rate Resp SpO2 Height Weight   (!) 158/73 98.1 °F (36.7 °C) Oral 81 14 98 % 5' 9\" (1.753 m) 220 lb (99.8 kg)       Physical Exam  Vitals and nursing note reviewed. Constitutional:       General: He is not in acute distress. Appearance: He is well-developed. HENT:      Head: Normocephalic and atraumatic. Eyes:      Conjunctiva/sclera: Conjunctivae normal.   Neck:      Trachea: No tracheal deviation. Cardiovascular:      Rate and Rhythm: Normal rate and regular rhythm. Pulmonary:      Effort: Pulmonary effort is normal.      Breath sounds: Normal breath sounds. No wheezing or rales. Abdominal:      General: There is no distension. Palpations: Abdomen is soft. Tenderness: There is no abdominal tenderness. Musculoskeletal:         General: No deformity. Normal range of motion. Cervical back: Normal range of motion. Skin:     General: Skin is warm and dry. Findings: Erythema and rash present. Neurological:      Mental Status: He is alert and oriented to person, place, and time. RESULTS     EKG: All EKG's are interpreted by the Emergency Department Physician who either signs or Co-signsthis chart in the absence of a cardiologist.        RADIOLOGY:   Erendira Everts such as CT, Ultrasound and MRI are read by the radiologist. Plain radiographic images are visualized and preliminarily interpreted by the emergency physician with the below findings:      Interpretation per the Radiologist below, if available at the time ofthis note:    No orders to display         ED BEDSIDE ULTRASOUND:   Performed by ED Physician - none    LABS:  Labs Reviewed - No data to display    All other labs were within normal range or not returned as of this dictation.     EMERGENCY DEPARTMENT COURSE and DIFFERENTIAL DIAGNOSIS/MDM:   Vitals:    Vitals:    06/27/22 0144   BP: (!) 158/73   Pulse: 81   Resp: 14   Temp: 98.1 °F (36.7 °C)   TempSrc: Oral   SpO2: 98%   Weight: 220 lb (99.8 kg)   Height: 5' 9\" (1.753 m)       Patient was given thefollowing medications:  Medications   predniSONE (DELTASONE) tablet 60 mg (60 mg Oral Given 6/27/22 0240)   diphenhydrAMINE (BENADRYL) tablet 25 mg (25 mg Oral Given 6/27/22 0241)       ED COURSE & MEDICAL DECISION MAKING    Pertinent Labs & Imaging studies reviewed. (See chart for details)   -  Patient seen and evaluated in the emergency department. -  Triage and nursing notes reviewed and incorporated. -  Old chart records reviewed and incorporated. -  Differential diagnosis includes: Differential diagnosis: Vasculitis, bacterial skin infection, viral rash, systemic infectious rash, Anaphylaxis, Urticaria, other    -  Work-up included:  See above  -  ED treatment included: See above  -  Results discussed with patient. Patient presents for diffuse rash after going camping. On exam he does appear to have a dermatitis. Patient started on prednisone taper given antihistamines. Patient feels improved on reevaluation. Symptomatic treatment with expectant management discussed with the patient and they and/or family members present are amenable to treatment plan and outpatient follow-up. Strict return precautions were discussed with the patient and those present. They demonstrated understanding of when to return to the emergency department for new or worsening symptoms. .  The patient is agreeable with plan of care and disposition. REASSESSMENT          CRITICAL CARE TIME   Total Critical Care time was 0 minutes, excluding separately reportable procedures. There was a high probability of clinically significant/life threatening deterioration in the patient's condition which required my urgent intervention. CONSULTS:  None    PROCEDURES:  Unless otherwise noted below, none     Procedures    FINAL IMPRESSION      1.  Dermatitis          DISPOSITION/PLAN   DISPOSITION Decision To Discharge 06/27/2022 02:34:06 AM      PATIENT REFERREDTO:  Lisa Godinez MD  [id number]

## 2022-11-30 ENCOUNTER — APPOINTMENT (OUTPATIENT)
Dept: CT IMAGING | Age: 22
End: 2022-11-30
Payer: COMMERCIAL

## 2022-11-30 ENCOUNTER — HOSPITAL ENCOUNTER (EMERGENCY)
Age: 22
Discharge: HOME | End: 2022-11-30
Attending: STUDENT IN AN ORGANIZED HEALTH CARE EDUCATION/TRAINING PROGRAM
Payer: COMMERCIAL

## 2022-11-30 VITALS
HEIGHT: 69 IN | TEMPERATURE: 98 F | BODY MASS INDEX: 34.07 KG/M2 | SYSTOLIC BLOOD PRESSURE: 136 MMHG | RESPIRATION RATE: 20 BRPM | WEIGHT: 230 LBS | DIASTOLIC BLOOD PRESSURE: 62 MMHG | HEART RATE: 69 BPM | OXYGEN SATURATION: 98 %

## 2022-11-30 DIAGNOSIS — S39.012A STRAIN OF LUMBAR REGION, INITIAL ENCOUNTER: Primary | ICD-10-CM

## 2022-11-30 DIAGNOSIS — M43.16 SPONDYLOLISTHESIS OF LUMBAR REGION: ICD-10-CM

## 2022-11-30 DIAGNOSIS — M62.838 SPASM OF MUSCLE: ICD-10-CM

## 2022-11-30 PROCEDURE — 96372 THER/PROPH/DIAG INJ SC/IM: CPT

## 2022-11-30 PROCEDURE — 6370000000 HC RX 637 (ALT 250 FOR IP): Performed by: STUDENT IN AN ORGANIZED HEALTH CARE EDUCATION/TRAINING PROGRAM

## 2022-11-30 PROCEDURE — 6360000002 HC RX W HCPCS: Performed by: STUDENT IN AN ORGANIZED HEALTH CARE EDUCATION/TRAINING PROGRAM

## 2022-11-30 PROCEDURE — 72131 CT LUMBAR SPINE W/O DYE: CPT

## 2022-11-30 PROCEDURE — 99284 EMERGENCY DEPT VISIT MOD MDM: CPT

## 2022-11-30 RX ORDER — DIAZEPAM 5 MG/1
5 TABLET ORAL ONCE
Status: COMPLETED | OUTPATIENT
Start: 2022-11-30 | End: 2022-11-30

## 2022-11-30 RX ORDER — METHYLPREDNISOLONE 4 MG/1
TABLET ORAL
Qty: 1 KIT | Refills: 0 | Status: SHIPPED | OUTPATIENT
Start: 2022-11-30 | End: 2022-12-06

## 2022-11-30 RX ORDER — KETOROLAC TROMETHAMINE 30 MG/ML
15 INJECTION, SOLUTION INTRAMUSCULAR; INTRAVENOUS ONCE
Status: COMPLETED | OUTPATIENT
Start: 2022-11-30 | End: 2022-11-30

## 2022-11-30 RX ADMIN — KETOROLAC TROMETHAMINE 15 MG: 30 INJECTION, SOLUTION INTRAMUSCULAR; INTRAVENOUS at 09:38

## 2022-11-30 RX ADMIN — DIAZEPAM 5 MG: 5 TABLET ORAL at 09:38

## 2022-11-30 ASSESSMENT — PAIN DESCRIPTION - PAIN TYPE
TYPE: CHRONIC PAIN
TYPE: ACUTE PAIN

## 2022-11-30 ASSESSMENT — PAIN DESCRIPTION - LOCATION
LOCATION: BACK
LOCATION: BACK

## 2022-11-30 ASSESSMENT — PAIN DESCRIPTION - DESCRIPTORS: DESCRIPTORS: SHARP

## 2022-11-30 ASSESSMENT — PAIN - FUNCTIONAL ASSESSMENT: PAIN_FUNCTIONAL_ASSESSMENT: 0-10

## 2022-11-30 ASSESSMENT — PAIN DESCRIPTION - FREQUENCY: FREQUENCY: CONTINUOUS

## 2022-11-30 ASSESSMENT — PAIN SCALES - GENERAL
PAINLEVEL_OUTOF10: 9
PAINLEVEL_OUTOF10: 5

## 2022-11-30 NOTE — DISCHARGE INSTRUCTIONS
You were seen in the emergency department with back pain. We feel that this is an acute flare and likely related to your sciatic nerve. I am prescribing you a steroid taper which should help with the inflammation and pain. Please call the number provided to follow-up with pain management and sports medicine.   You can return to the emergency department at anytime with any new or concerning changes to your health

## 2022-11-30 NOTE — ED PROVIDER NOTES
ATTENDING PHYSICIAN NOTE       Date of evaluation: 11/30/2022    Chief Complaint     Back Pain (Pt reporting chronic back pain. States he has a fx in L5. Was seeing ortho and had an injection about a year ago. However reports the stopped PT due to work schedule and hasn't had a second injection. Pain sometimes radiates down left leg but reports it's not radiating today. )      History of Present Illness     Daniel Coffey is a 25 y.o. male who presents with acute on chronic back pain. States that symptoms started on Sunday. Denies any injury or trauma. Back pain is mostly lower left side and radiates down to his knee. He denies any numbness or tingling. Denies any saddle anesthesia. He tried taking Robaxin with no relief. Denies any recent fever, fatigue, congestion, cough, shortness of breath, chest pain, palpitations, nausea, vomiting, abdominal pain, dysuria, hematuria, change in urinary frequency, neck pain, neck stiffness, dizziness, syncope. Reports that he stopped physical therapy due to his work schedule. Reports history of epidural injection which provided relief. Review of Systems     Review of Systems   All other systems reviewed and are negative. Past Medical, Surgical, Family, and Social History     He has a past medical history of Fractures. He has a past surgical history that includes [place] tooth extraction. His family history is not on file. He reports that he has never smoked. He has never used smokeless tobacco. He reports current alcohol use. He reports current drug use. Frequency: 3.00 times per week. Drug: Marijuana Jyoti Ege). Medications     Discharge Medication List as of 11/30/2022 10:04 AM          Allergies     He is allergic to amoxicillin. Physical Exam     INITIAL VITALS: BP: (!) 144/73, Temp: 98 °F (36.7 °C), Heart Rate: 76, Resp: 20, SpO2: 98 %   Physical Exam  Vitals and nursing note reviewed. Constitutional:       General: He is not in acute distress. Appearance: He is not ill-appearing. HENT:      Head: Normocephalic and atraumatic. Right Ear: External ear normal.      Left Ear: External ear normal.      Nose: Nose normal. No congestion or rhinorrhea. Mouth/Throat:      Pharynx: Oropharynx is clear. No oropharyngeal exudate or posterior oropharyngeal erythema. Eyes:      Extraocular Movements: Extraocular movements intact. Conjunctiva/sclera: Conjunctivae normal.   Cardiovascular:      Rate and Rhythm: Normal rate and regular rhythm. Pulses: Normal pulses. Heart sounds: Normal heart sounds. No murmur heard. No friction rub. No gallop. Pulmonary:      Effort: Pulmonary effort is normal.      Breath sounds: Normal breath sounds. No wheezing or rhonchi. Chest:      Chest wall: No tenderness. Abdominal:      General: There is no distension. Palpations: Abdomen is soft. Tenderness: There is no abdominal tenderness. There is no right CVA tenderness, left CVA tenderness, guarding or rebound. Musculoskeletal:         General: Normal range of motion. Cervical back: Normal range of motion and neck supple. No rigidity. Right lower leg: No edema. Left lower leg: No edema. Comments: Paraspinal lumbar tenderness and muscle spasm, no midline tenderness, 5 out of 5 strength throughout upper and lower extremities, sensation to light touch intact, normal reflexes, pain with left-sided straight leg raise   Skin:     General: Skin is warm. Capillary Refill: Capillary refill takes less than 2 seconds. Coloration: Skin is not jaundiced. Findings: No bruising or erythema. Neurological:      General: No focal deficit present. Mental Status: He is alert and oriented to person, place, and time. Cranial Nerves: No cranial nerve deficit. Sensory: No sensory deficit. Motor: No weakness.       Gait: Gait normal.   Psychiatric:         Mood and Affect: Mood normal.         Behavior: Behavior normal.       Diagnostic Results         RADIOLOGY:  CT LUMBAR SPINE WO CONTRAST   Final Result   1. No acute finding of the lumbar spine. 2. 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 LABS:   No results found for this visit on 11/30/22. ED BEDSIDE ULTRASOUND:  No results found. RECENT VITALS:  BP: 136/62,Temp: 98 °F (36.7 °C), Heart Rate: 69, Resp: 20, SpO2: 98 %     Procedures         ED Course     Nursing Notes, Past Medical Hx, Past Surgical Hx, Social Hx,Allergies, and Family Hx were reviewed. patient was given the following medications:  Orders Placed This Encounter   Medications    ketorolac (TORADOL) injection 15 mg    diazePAM (VALIUM) tablet 5 mg    methylPREDNISolone (MEDROL DOSEPACK) 4 MG tablet     Sig: Take by mouth. Dispense:  1 kit     Refill:  0       CONSULTS:  None    MEDICAL DECISIONMAKING / ASSESSMENT / Ed Emerita is a 25 y.o. male who presents with acute on chronic lumbar back pain. He presented hypertensive, afebrile, heart rate of 76, respiratory rate of 20 and satting at 98% on room air. On exam he did have left-sided paraspinal tenderness with intact range of motion, strength and sensation. Given history and exam presentation appears secondary to underlying lumbar radiculopathy and muscle spasm. He does have history of L5 fracture therefore will obtain imaging to assess this or additional findings. He has no weakness or sensation changes concerning for cauda equina. He has no risk factors concerning for epidural abscess. I obtained imaging as noted below. I interpreted the imaging and note  CT lumbar spine with no acute abnormalities. He does have bilateral spondylolysis at L5 with mild spondylolisthesis at L5 and S1    Patient reported some improvement after Toradol and Valium. Will place patient on steroid taper for radicular symptoms. He will follow-up with his spine and pain management physician. All questions and concerns were addressed. Strict return precautions reviewed. Clinical Impression     1. Strain of lumbar region, initial encounter    2. Spasm of muscle    3.  Spondylolisthesis of lumbar region        Disposition     PATIENT REFERRED TO:  Armida Saleh MD  [address] [phone]    In 1 day  To schedule follow up    Aditi Roberto  [facility] [address]  [address]  [phone]          DISCHARGE MEDICATIONS:  Discharge Medication List as of 11/30/2022 10:04 AM        START taking these medications    Details   methylPREDNISolone (MEDROL DOSEPACK) 4 MG tablet Take by mouth., Disp-1 kit, R-0Normal             DISPOSITION Decision To Discharge 11/30/2022 09:51:36 AM          Raymond Nam MD  11/30/22 0813

## 2022-11-30 NOTE — ED NOTES
Patient discharged to lobby to wait for a ride. He was given Valium today and is aware of the effects. 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       Brian Gross RN  11/30/22 1017

## 2022-11-30 NOTE — Clinical Note
Shelby Villasenor was seen and treated in our emergency department on 11/30/2022. He may return to work on 12/05/2022. At work [employer] heavy objects due to his back injury. I also recommend that he is able to sit down and take breaks at work when needed or when pain increases. If you have any questions or concerns, please don't hesitate to call.       Jacqueline Cabello MD

## 2023-02-15 ENCOUNTER — APPOINTMENT (OUTPATIENT)
Dept: ULTRASOUND IMAGING | Age: 23
End: 2023-02-15
Payer: COMMERCIAL

## 2023-02-15 ENCOUNTER — APPOINTMENT (OUTPATIENT)
Dept: CT IMAGING | Age: 23
End: 2023-02-15
Payer: COMMERCIAL

## 2023-02-15 ENCOUNTER — HOSPITAL ENCOUNTER (EMERGENCY)
Age: 23
Discharge: HOME | End: 2023-02-15
Attending: EMERGENCY MEDICINE
Payer: COMMERCIAL

## 2023-02-15 VITALS
HEIGHT: 69 IN | DIASTOLIC BLOOD PRESSURE: 75 MMHG | TEMPERATURE: 97.5 F | BODY MASS INDEX: 35.55 KG/M2 | OXYGEN SATURATION: 98 % | HEART RATE: 92 BPM | WEIGHT: 240 LBS | RESPIRATION RATE: 18 BRPM | SYSTOLIC BLOOD PRESSURE: 144 MMHG

## 2023-02-15 DIAGNOSIS — R59.0 PELVIC LYMPHADENOPATHY: Primary | ICD-10-CM

## 2023-02-15 LAB
A/G RATIO: 1.4 (ref 1.1–2.2)
ALBUMIN SERPL-MCNC: 4.5 G/DL (ref 3.4–5)
ALP BLD-CCNC: 65 U/L (ref 40–129)
ALT SERPL-CCNC: 29 U/L (ref 10–40)
ANION GAP SERPL CALCULATED.3IONS-SCNC: 14 MMOL/L (ref 3–16)
AST SERPL-CCNC: 43 U/L (ref 15–37)
BASOPHILS ABSOLUTE: 0.2 K/UL (ref 0–0.2)
BASOPHILS RELATIVE PERCENT: 1.7 %
BILIRUB SERPL-MCNC: 0.5 MG/DL (ref 0–1)
BILIRUBIN URINE: ABNORMAL
BLOOD, URINE: NEGATIVE
BUN BLDV-MCNC: 10 MG/DL (ref 7–20)
CALCIUM SERPL-MCNC: 9.6 MG/DL (ref 8.3–10.6)
CHLORIDE BLD-SCNC: 100 MMOL/L (ref 99–110)
CLARITY: CLEAR
CO2: 23 MMOL/L (ref 21–32)
COLOR: YELLOW
CREAT SERPL-MCNC: 1 MG/DL (ref 0.9–1.3)
EOSINOPHILS ABSOLUTE: 0.1 K/UL (ref 0–0.6)
EOSINOPHILS RELATIVE PERCENT: 1 %
GFR SERPL CREATININE-BSD FRML MDRD: >60 ML/MIN/{1.73_M2}
GLUCOSE BLD-MCNC: 97 MG/DL (ref 70–99)
GLUCOSE URINE: NEGATIVE MG/DL
HCT VFR BLD CALC: 43.4 % (ref 40.5–52.5)
HEMOGLOBIN: 13.9 G/DL (ref 13.5–17.5)
KETONES, URINE: 15 MG/DL
LEUKOCYTE ESTERASE, URINE: NEGATIVE
LYMPHOCYTES ABSOLUTE: 1.6 K/UL (ref 1–5.1)
LYMPHOCYTES RELATIVE PERCENT: 16.5 %
MCH RBC QN AUTO: 25 PG (ref 26–34)
MCHC RBC AUTO-ENTMCNC: 32 G/DL (ref 31–36)
MCV RBC AUTO: 78.2 FL (ref 80–100)
MICROSCOPIC EXAMINATION: ABNORMAL
MONOCYTES ABSOLUTE: 1.2 K/UL (ref 0–1.3)
MONOCYTES RELATIVE PERCENT: 12.2 %
NEUTROPHILS ABSOLUTE: 6.8 K/UL (ref 1.7–7.7)
NEUTROPHILS RELATIVE PERCENT: 68.6 %
NITRITE, URINE: NEGATIVE
PDW BLD-RTO: 13.8 % (ref 12.4–15.4)
PH UA: 6 (ref 5–8)
PLATELET # BLD: 215 K/UL (ref 135–450)
PMV BLD AUTO: 8.9 FL (ref 5–10.5)
POTASSIUM REFLEX MAGNESIUM: 4.9 MMOL/L (ref 3.5–5.1)
PROTEIN UA: NEGATIVE MG/DL
RBC # BLD: 5.55 M/UL (ref 4.2–5.9)
SODIUM BLD-SCNC: 137 MMOL/L (ref 136–145)
SPECIFIC GRAVITY UA: >=1.03 (ref 1–1.03)
TOTAL PROTEIN: 7.7 G/DL (ref 6.4–8.2)
URINE REFLEX TO CULTURE: ABNORMAL
URINE TYPE: ABNORMAL
UROBILINOGEN, URINE: 0.2 E.U./DL
WBC # BLD: 10 K/UL (ref 4–11)

## 2023-02-15 PROCEDURE — 74177 CT ABD & PELVIS W/CONTRAST: CPT

## 2023-02-15 PROCEDURE — 85025 COMPLETE CBC W/AUTO DIFF WBC: CPT

## 2023-02-15 PROCEDURE — 80053 COMPREHEN METABOLIC PANEL: CPT

## 2023-02-15 PROCEDURE — 6360000004 HC RX CONTRAST MEDICATION: Performed by: EMERGENCY MEDICINE

## 2023-02-15 PROCEDURE — 93975 VASCULAR STUDY: CPT

## 2023-02-15 PROCEDURE — 81003 URINALYSIS AUTO W/O SCOPE: CPT

## 2023-02-15 PROCEDURE — 76870 US EXAM SCROTUM: CPT

## 2023-02-15 PROCEDURE — 99285 EMERGENCY DEPT VISIT HI MDM: CPT

## 2023-02-15 RX ADMIN — IOPAMIDOL 75 ML: 755 INJECTION, SOLUTION INTRAVENOUS at 15:20

## 2023-02-15 ASSESSMENT — PAIN DESCRIPTION - LOCATION: LOCATION: ABDOMEN;SCROTUM;LEG

## 2023-02-15 ASSESSMENT — PAIN - FUNCTIONAL ASSESSMENT
PAIN_FUNCTIONAL_ASSESSMENT: 0-10
PAIN_FUNCTIONAL_ASSESSMENT: ACTIVITIES ARE NOT PREVENTED

## 2023-02-15 ASSESSMENT — PAIN DESCRIPTION - ORIENTATION: ORIENTATION: LEFT

## 2023-02-15 ASSESSMENT — PAIN DESCRIPTION - PAIN TYPE: TYPE: ACUTE PAIN

## 2023-02-15 ASSESSMENT — PAIN SCALES - GENERAL: PAINLEVEL_OUTOF10: 10

## 2023-02-15 NOTE — Clinical Note
Christiano Mcconnell was seen and treated in our emergency department on 2/15/2023. He may return to work on 02/16/2023. Patient may remain seated as needed for work     If you have any questions or concerns, please don't hesitate to call.       Nikita Goff MD

## 2023-02-15 NOTE — Clinical Note
Andrés Vargas was seen and treated in our emergency department on 2/15/2023. He may return to work on 02/16/2023. Patient may remain seated as needed for work     If you have any questions or concerns, please don't hesitate to call.       Claudio Shields MD

## 2023-02-15 NOTE — Clinical Note
Wallace Nguyen was seen and treated in our emergency department on 2/15/2023. He may return to work on 02/16/2023. Patient may remain seated as needed for work     If you have any questions or concerns, please don't hesitate to call.       Mya Adkins MD

## 2023-02-15 NOTE — ED PROVIDER NOTES
[facility]  ED  EMERGENCY DEPARTMENT ENCOUNTER      Pt Name: Migue Conner  MRN: [id number]  [place] 2000  Date of evaluation: 2/15/2023  Provider: Regis Kehr, MD    CHIEF COMPLAINT       Chief Complaint   Patient presents with    Other     Pt presents to the ER complaining of pain in the upper left thigh with a palpable mass or \"knot\". Pt also complains of pain in his left testicle that goes into his abdomen. Pt states pain gets worse the more his is on his feet and pain radiates to the leg by the end of the day. Pt states pain in the scrotum is constant and worse when walking. HISTORY OF PRESENT ILLNESS   (Location/Symptom, Timing/Onset, Context/Setting, Quality, Duration, Modifying Factors, Severity)  Note limiting factors. Migue Conner is a 26 y.o. male with past medical history of no significant illness here today for a painful mass in his left thigh. Patient states for the last 6 days or so he has had a \"knot\" in his left proximal thigh. Notes it is tender to palpation. Gets larger as the day progresses. No overlying redness. Denies any trauma or injury. States the pain starts in his left thigh and will radiate into his testicle and abdomen. He states his testicle itself does not hurt. No dysuria, hematuria urethral discharge or drainage. No flank pain. No fevers or chills. No weight loss. No rashes appreciated. No numbness tingling or weakness in the left leg    HPI    Nursing Notes were reviewed. REVIEW OF SYSTEMS    (2-9 systems for level 4, 10 or more for level 5)     Review of Systems    Please see HPI for pertinent positive and negative review of system findings. A full 10 system ROS was performed and otherwise negative.         PAST MEDICAL HISTORY     Past Medical History:   Diagnosis Date    Fractures          SURGICAL HISTORY       Past Surgical History:   Procedure Laterality Date    WISDOM TOOTH EXTRACTION           CURRENT MEDICATIONS       There are no discharge medications for this patient. ALLERGIES     Amoxicillin    FAMILY HISTORY     No family history on file. SOCIAL HISTORY       Social History     Socioeconomic History    Marital status: Single   Tobacco Use    Smoking status: Never    Smokeless tobacco: Never   Vaping Use    Vaping Use: Never used   Substance and Sexual Activity    Alcohol use: Yes     Comment: occ    Drug use: Yes     Frequency: 3.0 times per week     Types: Marijuana (Weed)       SCREENINGS    [place] Coma Scale  Eye Opening: Spontaneous  Best Verbal Response: Oriented  Best Motor Response: Obeys commands  Helio Coma Scale Score: 15          PHYSICAL EXAM    (up to 7 for level 4, 8 or more for level 5)     ED Triage Vitals [02/15/23 1324]   BP Temp Temp Source Heart Rate Resp SpO2 Height Weight   (!) 142/75 97.5 °F (36.4 °C) Oral 96 18 98 % 5' 9\" (1.753 m) 240 lb (108.9 kg)       Physical Exam    General appearance:  Cooperative. No acute distress. Skin:  Warm. Dry. Eye:  Extraocular movements intact. Ears, nose, mouth and throat:  Oral mucosa moist,  Neck:  Trachea midline. Heart:  Regular rate and rhythm  Perfusion:  intact  Respiratory:  Respirations nonlabored. Lungs clear to auscultation bilaterally. Abdominal:   Non distended. Nontender  : Normal-appearing external male circumcised genitalia. Testicles nontender bilaterally with positive cremasteric reflex. Testicles are normal in shape, size and lie. No inguinal masses or appreciable lymphadenopathy. No inguinal wall defects. Neurological:  Alert and oriented x 3. Moves all extremities spontaneously  Musculoskeletal:   Normal ROM, no deformities. There is an approximate 2 to 3 cm discrete and tender mass in the left proximal superior medial thigh that is nonpulsatile and nonreducible. No overlying skin changes.           Psychiatric:  Normal mood      DIAGNOSTIC RESULTS       Labs Reviewed   CBC WITH AUTO DIFFERENTIAL - Abnormal; Notable for the following components:       Result Value    MCV 78.2 (*)     MCH 25.0 (*)     All other components within normal limits   COMPREHENSIVE METABOLIC PANEL W/ REFLEX TO MG FOR LOW K - Abnormal; Notable for the following components:    AST 43 (*)     All other components within normal limits   URINALYSIS WITH REFLEX TO CULTURE - Abnormal; Notable for the following components:    Bilirubin Urine SMALL (*)     Ketones, Urine 15 (*)     All other components within normal limits       Interpretation per the Radiologist below, if obtained/available at the time of this note:    [address]   Final Result   The testicles are unremarkable in appearance. US DUP ABD PEL RETRO SCROT COMPLETE   Preliminary Result   The testicles are unremarkable in appearance. CT ABDOMEN PELVIS W IV CONTRAST Additional Contrast? None   Preliminary Result   1. Prominent left inguinal lymphadenopathy with visualization mildly   prominent retroperitoneal/left pelvic lymph nodes. While   infectious/inflammatory processes are in the differential diagnosis, other   etiologies including neoplastic abnormality such as lymphoma also in the   differential diagnosis. 2.  No evidence of bowel obstruction, intraperitoneal free air, or abscess. No radiographic finding to suggest presence of appendicitis. All other labs/imaging were within normal range or not returned as of this dictation. EMERGENCY DEPARTMENT COURSE and DIFFERENTIAL DIAGNOSIS/MDM:   Vitals:    Vitals:    02/15/23 1324 02/15/23 1646   BP: (!) 142/75 (!) 144/75   Pulse: 96 92   Resp: 18 18   Temp: 97.5 °F (36.4 °C)    TempSrc: Oral    SpO2: 98% 98%   Weight: 240 lb (108.9 kg)    Height: 5' 9\" (1.753 m)        Differential Diagnosis: Lymphadenopathy, inguinal hernia, femoral hernia, aneurysm, soft tissue mass    Patient presents emergency department today with a mildly uncomfortable mass in his proximal thigh/femur region.   No obvious palpable inguinal abnormality. No obvious palpable defect. Appears to be nonpulsatile. No signs of overlying infection. States the pain radiates into his testicles at times but no difficulty urinating. On exam, testicles were normal without any tenderness or appreciable abnormality. Positive cremasteric reflex bilaterally. CT scan performed does show that this is likely due to enlarged lymphadenopathy with further lymphadenopathy extending into the pelvis. Given this, I did perform ultrasound to rule out obvious testicular masses or signs of malignancy. This was overall normal.  Normal blood flow. No overlying signs of infection. No signs of STD. Concern for possible underlying malignancy. Expressed this concern to the patient. He already has follow-up scheduled with his primary care physician early next week which he was encouraged to keep but will also be provided with outpatient oncology follow-up. May likely need outpatient biopsy. No indication for antibiotics at this time    Medications and Route:   Medications   iopamidol (ISOVUE-370) 76 % injection 75 mL (75 mLs IntraVENous Given 2/15/23 1520)       History From: Patient         Chronic Conditions: Noted in HPI    CONSULTS: (Who and What was discussed)  None            Disposition Considerations (Tests not ordered but considered, Shared Decision Making, Pt Expectation of Test or Tx.):     Edouard Sherman M.D., am the primary clinician of record. MDM      CONSULTS     None    Critical Care:   None    REASSESSMENT          PROCEDURE     Unless otherwise noted below, none     Procedures      FINAL IMPRESSION      1.  Pelvic lymphadenopathy            DISPOSITION/PLAN   DISPOSITION Decision To Discharge 02/15/2023 07:49:03 PM        PATIENT REFERRED TO:  Leandro Rose MD  [address] [place] [address]  [phone]    Schedule an appointment as soon as possible for a visit       Mitchell Huston MD  [address] [facility] [id number]  [phone]    Schedule an appointment as soon as possible for a visit   Please call to schedule an appointment with Hematology/Oncology as soon as possible    DISCHARGE MEDICATIONS:  There are no discharge medications for this patient. Controlled Substances Monitoring:     No flowsheet data found.     (Please note that portions of this note were completed with a voice recognition program.  Efforts were made to edit the dictations but occasionally words are mis-transcribed.)    Edna Vickers MD (electronically signed)  Attending Emergency Physician            Verner Crumble, MD  02/16/23 1822

## 2023-02-16 NOTE — DISCHARGE INSTRUCTIONS
It is very important for you to follow-up with your primary care physician and outpatient hematology/oncology to evaluate the cause of your pelvic lymphadenopathy.

## 2023-03-05 ENCOUNTER — HOSPITAL ENCOUNTER (EMERGENCY)
Age: 23
Discharge: HOME | End: 2023-03-05
Attending: EMERGENCY MEDICINE
Payer: COMMERCIAL

## 2023-03-05 ENCOUNTER — APPOINTMENT (OUTPATIENT)
Dept: CT IMAGING | Age: 23
End: 2023-03-05
Payer: COMMERCIAL

## 2023-03-05 VITALS
BODY MASS INDEX: 37.03 KG/M2 | HEART RATE: 73 BPM | TEMPERATURE: 97 F | WEIGHT: 250 LBS | HEIGHT: 69 IN | RESPIRATION RATE: 18 BRPM | SYSTOLIC BLOOD PRESSURE: 127 MMHG | OXYGEN SATURATION: 100 % | DIASTOLIC BLOOD PRESSURE: 67 MMHG

## 2023-03-05 DIAGNOSIS — R10.33 PERIUMBILICAL ABDOMINAL PAIN: Primary | ICD-10-CM

## 2023-03-05 LAB
A/G RATIO: 1.7 (ref 1.1–2.2)
ALBUMIN SERPL-MCNC: 4.8 G/DL (ref 3.4–5)
ALP BLD-CCNC: 64 U/L (ref 40–129)
ALT SERPL-CCNC: 30 U/L (ref 10–40)
ANION GAP SERPL CALCULATED.3IONS-SCNC: 11 MMOL/L (ref 3–16)
AST SERPL-CCNC: 27 U/L (ref 15–37)
BASOPHILS ABSOLUTE: 0.1 K/UL (ref 0–0.2)
BASOPHILS RELATIVE PERCENT: 0.7 %
BILIRUB SERPL-MCNC: 0.5 MG/DL (ref 0–1)
BILIRUBIN URINE: NEGATIVE
BLOOD, URINE: NEGATIVE
BUN BLDV-MCNC: 15 MG/DL (ref 7–20)
CALCIUM SERPL-MCNC: 9.7 MG/DL (ref 8.3–10.6)
CHLORIDE BLD-SCNC: 98 MMOL/L (ref 99–110)
CLARITY: CLEAR
CO2: 25 MMOL/L (ref 21–32)
COLOR: YELLOW
CREAT SERPL-MCNC: 1 MG/DL (ref 0.9–1.3)
EOSINOPHILS ABSOLUTE: 0.3 K/UL (ref 0–0.6)
EOSINOPHILS RELATIVE PERCENT: 2 %
GFR SERPL CREATININE-BSD FRML MDRD: >60 ML/MIN/{1.73_M2}
GLUCOSE BLD-MCNC: 102 MG/DL (ref 70–99)
GLUCOSE URINE: NEGATIVE MG/DL
HCT VFR BLD CALC: 47.3 % (ref 40.5–52.5)
HEMOGLOBIN: 15.5 G/DL (ref 13.5–17.5)
KETONES, URINE: NEGATIVE MG/DL
LEUKOCYTE ESTERASE, URINE: NEGATIVE
LIPASE: 37 U/L (ref 13–60)
LYMPHOCYTES ABSOLUTE: 3.5 K/UL (ref 1–5.1)
LYMPHOCYTES RELATIVE PERCENT: 23.3 %
MCH RBC QN AUTO: 25.3 PG (ref 26–34)
MCHC RBC AUTO-ENTMCNC: 32.8 G/DL (ref 31–36)
MCV RBC AUTO: 77.2 FL (ref 80–100)
MICROSCOPIC EXAMINATION: NORMAL
MONOCYTES ABSOLUTE: 1.5 K/UL (ref 0–1.3)
MONOCYTES RELATIVE PERCENT: 10 %
NEUTROPHILS ABSOLUTE: 9.5 K/UL (ref 1.7–7.7)
NEUTROPHILS RELATIVE PERCENT: 64 %
NITRITE, URINE: NEGATIVE
PDW BLD-RTO: 14.7 % (ref 12.4–15.4)
PH UA: 6 (ref 5–8)
PLATELET # BLD: 276 K/UL (ref 135–450)
PMV BLD AUTO: 8.7 FL (ref 5–10.5)
POTASSIUM REFLEX MAGNESIUM: 4.6 MMOL/L (ref 3.5–5.1)
PROTEIN UA: NEGATIVE MG/DL
RBC # BLD: 6.12 M/UL (ref 4.2–5.9)
SODIUM BLD-SCNC: 134 MMOL/L (ref 136–145)
SPECIFIC GRAVITY UA: 1.01 (ref 1–1.03)
TOTAL PROTEIN: 7.6 G/DL (ref 6.4–8.2)
URINE REFLEX TO CULTURE: NORMAL
URINE TYPE: NORMAL
UROBILINOGEN, URINE: 0.2 E.U./DL
WBC # BLD: 14.8 K/UL (ref 4–11)

## 2023-03-05 PROCEDURE — 83690 ASSAY OF LIPASE: CPT

## 2023-03-05 PROCEDURE — 96374 THER/PROPH/DIAG INJ IV PUSH: CPT

## 2023-03-05 PROCEDURE — 6360000004 HC RX CONTRAST MEDICATION: Performed by: PHYSICIAN ASSISTANT

## 2023-03-05 PROCEDURE — 2580000003 HC RX 258: Performed by: PHYSICIAN ASSISTANT

## 2023-03-05 PROCEDURE — 85025 COMPLETE CBC W/AUTO DIFF WBC: CPT

## 2023-03-05 PROCEDURE — 80053 COMPREHEN METABOLIC PANEL: CPT

## 2023-03-05 PROCEDURE — 99285 EMERGENCY DEPT VISIT HI MDM: CPT

## 2023-03-05 PROCEDURE — 6360000002 HC RX W HCPCS: Performed by: PHYSICIAN ASSISTANT

## 2023-03-05 PROCEDURE — 81003 URINALYSIS AUTO W/O SCOPE: CPT

## 2023-03-05 PROCEDURE — 74177 CT ABD & PELVIS W/CONTRAST: CPT

## 2023-03-05 RX ORDER — KETOROLAC TROMETHAMINE 30 MG/ML
15 INJECTION, SOLUTION INTRAMUSCULAR; INTRAVENOUS ONCE
Status: COMPLETED | OUTPATIENT
Start: 2023-03-05 | End: 2023-03-05

## 2023-03-05 RX ORDER — SUCRALFATE 1 G/1
1 TABLET ORAL 4 TIMES DAILY
Qty: 60 TABLET | Refills: 0 | Status: SHIPPED | OUTPATIENT
Start: 2023-03-05

## 2023-03-05 RX ORDER — OMEPRAZOLE 40 MG/1
40 CAPSULE, DELAYED RELEASE ORAL
Qty: 30 CAPSULE | Refills: 0 | Status: SHIPPED | OUTPATIENT
Start: 2023-03-05

## 2023-03-05 RX ORDER — 0.9 % SODIUM CHLORIDE 0.9 %
1000 INTRAVENOUS SOLUTION INTRAVENOUS ONCE
Status: COMPLETED | OUTPATIENT
Start: 2023-03-05 | End: 2023-03-05

## 2023-03-05 RX ADMIN — SODIUM CHLORIDE 1000 ML: 9 INJECTION, SOLUTION INTRAVENOUS at 09:03

## 2023-03-05 RX ADMIN — KETOROLAC TROMETHAMINE 15 MG: 30 INJECTION, SOLUTION INTRAMUSCULAR; INTRAVENOUS at 09:02

## 2023-03-05 RX ADMIN — IOPAMIDOL 75 ML: 755 INJECTION, SOLUTION INTRAVENOUS at 09:30

## 2023-03-05 ASSESSMENT — PAIN SCALES - GENERAL
PAINLEVEL_OUTOF10: 0
PAINLEVEL_OUTOF10: 9
PAINLEVEL_OUTOF10: 9

## 2023-03-05 ASSESSMENT — PAIN - FUNCTIONAL ASSESSMENT
PAIN_FUNCTIONAL_ASSESSMENT: 0-10
PAIN_FUNCTIONAL_ASSESSMENT: 0-10

## 2023-03-05 NOTE — ED NOTES
Discharge paperwork given to and reviewed with pt. Pt verbalized understanding and all questions answered. Pt encouraged to return if having worsening symptoms or new symptoms discussed in discharge paperwork.  Pt to follow up with PCP  Rx x 2 given and medications reviewed with pt.   IV removed with catheter intact. Pt in NAD, RR even and unlabored. Pt off unit ambulatory      Laurel Ly RN  03/05/23 5083

## 2023-03-05 NOTE — ED PROVIDER NOTES
[facility]  ED  EMERGENCY DEPARTMENT ENCOUNTER        Pt Name: Olinda Madrigal  MRN: [id number]  [place] 2000  Date of evaluation: 3/5/2023  Provider: Geronimo Hurst PA-C  PCP: Tina Escobar MD  Note Started: 8:50 AM EST 3/5/23      LEO. I have evaluated this patient. My supervising physician was available for consultation. CHIEF COMPLAINT       Chief Complaint   Patient presents with    Abdominal Pain     Generalized abd pain since Thursday. Pt denies n/v/d. Last BM this morning       HISTORY OF PRESENT ILLNESS: 1 or more Elements     History From: patient  Limitations to history : None    Olinda Madrigal is a 26 y.o. male who presents to the emergency department with a chief complaint of a 3 to 4-day history of some periumbilical abdominal pain that he rates at a 9 out of 10 that he describes as cramping. Denies any appetite change, nausea, vomiting, fevers, diarrhea, bloody stool, dysuria or hematuria. Had a normal bowel movement earlier this morning. Denies any history of abdominal surgeries. Denies chest pain, shortness of breath or radiation of the pain. States he does drink at least 1 caffeinated beverage a day, smokes marijuana on a almost daily basis and occasionally drinks alcohol and had 4 beers last night. Denies any ongoing medical issues. Denies any aggravating leaving factors for his pain. Has not taken anything for the pain yet today. Reviewing records patient was here in the emergency department last month and had CT imaging that revealed some prominent inguinal lymphadenopathy with mildly prominent retroperitoneal and left pelvic lymph nodes. Nursing Notes were all reviewed and agreed with or any disagreements were addressed in the HPI. REVIEW OF SYSTEMS :      Review of Systems    Positives and Pertinent negatives as per HPI.      SURGICAL HISTORY     Past Surgical History:   Procedure Laterality Date    WISDOM TOOTH EXTRACTION CURRENTMEDICATIONS       Previous Medications    No medications on file       ALLERGIES     Amoxicillin    FAMILYHISTORY     History reviewed. No pertinent family history. SOCIAL HISTORY       Social History     Tobacco Use    Smoking status: Never    Smokeless tobacco: Never   Vaping Use    Vaping Use: Never used   Substance Use Topics    Alcohol use: Yes     Comment: occ    Drug use: Yes     Frequency: 3.0 times per week     Types: Marijuana (Weed)       SCREENINGS        [place] Coma Scale  Eye Opening: Spontaneous  Best Verbal Response: Oriented  Best Motor Response: Obeys commands  Helio Coma Scale Score: 15                CIWA Assessment  BP: 127/67  Heart Rate: 73           PHYSICAL EXAM  1 or more Elements     ED Triage Vitals   BP Temp Temp Source Heart Rate Resp SpO2 Height Weight   03/05/23 0813 03/05/23 0811 03/05/23 0811 03/05/23 0813 03/05/23 0813 03/05/23 0813 03/05/23 0810 03/05/23 0810   (!) 160/87 97 °F (36.1 °C) Oral 77 18 99 % 5' 9\" (1.753 m) 250 lb (113.4 kg)       Physical Exam  Vitals and nursing note reviewed. Constitutional:       Appearance: He is well-developed. He is not diaphoretic. HENT:      Head: Atraumatic. Nose: Nose normal.   Eyes:      General:         Right eye: No discharge. Left eye: No discharge. Cardiovascular:      Rate and Rhythm: Normal rate and regular rhythm. Heart sounds: No murmur heard. No friction rub. No gallop. Pulmonary:      Effort: Pulmonary effort is normal. No respiratory distress. Breath sounds: No stridor. No wheezing, rhonchi or rales. Abdominal:      General: Bowel sounds are normal. There is no distension. Palpations: Abdomen is soft. There is no mass. Tenderness: There is abdominal tenderness. There is no guarding or rebound. Hernia: No hernia is present. Comments: Subjective tenderness throughout the abdomen without guarding, rebound or rigidity.    Musculoskeletal:         General: No swelling. Normal range of motion. Cervical back: Normal range of motion. Skin:     General: Skin is warm and dry. Findings: No erythema or rash. Neurological:      Mental Status: He is alert and oriented to person, place, and time. Cranial Nerves: No cranial nerve deficit. Psychiatric:         Behavior: Behavior normal.           DIAGNOSTIC RESULTS   LABS:    Labs Reviewed   CBC WITH AUTO DIFFERENTIAL - Abnormal; Notable for the following components:       Result Value    WBC 14.8 (*)     RBC 6.12 (*)     MCV 77.2 (*)     MCH 25.3 (*)     Neutrophils Absolute 9.5 (*)     Monocytes Absolute 1.5 (*)     All other components within normal limits   COMPREHENSIVE METABOLIC PANEL W/ REFLEX TO MG FOR LOW K - Abnormal; Notable for the following components:    Sodium 134 (*)     Chloride 98 (*)     Glucose 102 (*)     All other components within normal limits   LIPASE   URINALYSIS WITH REFLEX TO CULTURE       When ordered only abnormal lab results are displayed. All other labs were within normal range or not returned as of this dictation. EKG: When ordered, EKG's are interpreted by the Emergency Department Physician in the absence of a cardiologist.  Please see their note for interpretation of EKG. RADIOLOGY:   Non-plain film images such as CT, Ultrasound and MRI are read by the radiologist. Plain radiographic images are visualized and preliminarily interpreted by the ED Provider with the below findings:        Interpretation per the Radiologist below, if available at the time of this note:    CT ABDOMEN PELVIS W IV CONTRAST Additional Contrast? None   Final Result   No acute abdominopelvic abnormality. No results found. No results found. PROCEDURES   Unless otherwise noted below, none     Procedures    CRITICAL CARE TIME (.cctime)       PAST MEDICAL HISTORY      has a past medical history of Fractures.      EMERGENCY DEPARTMENT COURSE and DIFFERENTIAL DIAGNOSIS/MDM:   Vitals: Vitals:    03/05/23 0811 03/05/23 0813 03/05/23 0845 03/05/23 1014   BP:  (!) 160/87 127/82 127/67   Pulse:  77  73   Resp:  18  18   Temp: 97 °F (36.1 °C)      TempSrc: Oral      SpO2:  99% 97% 100%   Weight:       Height:           Patient was given the following medications:  Medications   ketorolac (TORADOL) injection 15 mg (15 mg IntraVENous Given 3/5/23 0902)   0.9 % sodium chloride bolus (0 mLs IntraVENous Stopped 3/5/23 1028)   iopamidol (ISOVUE-370) 76 % injection 75 mL (75 mLs IntraVENous Given 3/5/23 0930)             Is this patient to be included in the SEP-1 Core Measure due to severe sepsis or septic shock? No   Exclusion criteria - the patient is NOT to be included for SEP-1 Core Measure due to: Infection is not suspected    Chronic Conditions affecting care:    has a past medical history of Fractures. CONSULTS: (Who and What was discussed)  None      Social Determinants Significantly Affecting Health : None    Records Reviewed (External and Source) reviewing records from his internal medicine doctor on 2/21/2023 Dr. Jaylin Garcia patient was seen in follow-up after he was seen here in the emergency department had CT imaging that revealed lymphadenopathy on his CT as listed above in HPI. He actually has an appointment with oncology in 2 days for biopsy as he states he is still having some lymphadenopathy in his groin but this has improved. He was placed on antibiotics and steroids reviewing his primary care doctor's note (Decadron and doxycycline). CC/HPI Summary, DDx, ED Course, and Reassessment: Recheck of the patient after IV Toradol he is feeling better with a repeat benign abdominal exam.  CT imaging is actually unremarkable and reveals no adenopathy. Concern was for possible worsening lymphadenopathy given the patient's recent history. He is already scheduled to follow-up with oncology for biopsy of his still mildly enlarged lymph node in his groin.   He was educated on cutting back on smoking and caffeine use. We will try PPI and Carafate. Does have mild leukocytosis but remainder of laboratory testing is unremarkable. He will continue to follow-up with his specialist as an outpatient. Leukocytosis could be from recent steroid use still. Low suspicion for acute appendicitis, perforated viscus, diverticulitis or other emergent etiology at this time. Will return here for any worsening of symptoms or problems at home. Disposition Considerations (tests considered but not done, Admit vs D/C, Shared Decision Making, Pt Expectation of Test or Tx.):        I am the Primary Clinician of Record. FINAL IMPRESSION      1.  Periumbilical abdominal pain          DISPOSITION/PLAN     DISPOSITION Decision To Discharge 03/05/2023 09:44:25 AM      PATIENT REFERRED TO:  Augustina Dickerson MD  [address] [place] [address]  [phone]    Schedule an appointment as soon as possible for a visit in 3 days  For re-check    [facility]  ED  [address]        DISCHARGE MEDICATIONS:  New Prescriptions    OMEPRAZOLE (PRILOSEC) 40 MG DELAYED RELEASE CAPSULE    Take 1 capsule by mouth every morning (before breakfast)    SUCRALFATE (CARAFATE) 1 GM TABLET    Take 1 tablet by mouth 4 times daily       DISCONTINUED MEDICATIONS:  Discontinued Medications    No medications on file              (Please note that portions of this note were completed with a voice recognition program.  Efforts were made to edit the dictations but occasionally words are mis-transcribed.)    Sandee Zamudio PA-C (electronically signed)       Sandee Zamudio PA-C  03/05/23 104

## 2023-03-07 ENCOUNTER — INITIAL CONSULT (OUTPATIENT)
Dept: SURGERY | Age: 23
End: 2023-03-07
Payer: COMMERCIAL

## 2023-03-07 VITALS
BODY MASS INDEX: 36.46 KG/M2 | WEIGHT: 246.2 LBS | HEIGHT: 69 IN | HEART RATE: 120 BPM | SYSTOLIC BLOOD PRESSURE: 131 MMHG | DIASTOLIC BLOOD PRESSURE: 91 MMHG

## 2023-03-07 DIAGNOSIS — R59.0 LYMPHADENOPATHY, INGUINAL: Primary | ICD-10-CM

## 2023-03-07 PROCEDURE — 1036F TOBACCO NON-USER: CPT | Performed by: SURGERY

## 2023-03-07 PROCEDURE — G8427 DOCREV CUR MEDS BY ELIG CLIN: HCPCS | Performed by: SURGERY

## 2023-03-07 PROCEDURE — G8484 FLU IMMUNIZE NO ADMIN: HCPCS | Performed by: SURGERY

## 2023-03-07 PROCEDURE — 99204 OFFICE O/P NEW MOD 45 MIN: CPT | Performed by: SURGERY

## 2023-03-07 PROCEDURE — G8417 CALC BMI ABV UP PARAM F/U: HCPCS | Performed by: SURGERY

## 2023-03-07 RX ORDER — SODIUM CHLORIDE 0.9 % (FLUSH) 0.9 %
5-40 SYRINGE (ML) INJECTION PRN
OUTPATIENT
Start: 2023-03-07

## 2023-03-07 RX ORDER — SODIUM CHLORIDE 0.9 % (FLUSH) 0.9 %
5-40 SYRINGE (ML) INJECTION EVERY 12 HOURS SCHEDULED
OUTPATIENT
Start: 2023-03-07

## 2023-03-07 RX ORDER — SODIUM CHLORIDE 9 MG/ML
INJECTION, SOLUTION INTRAVENOUS PRN
OUTPATIENT
Start: 2023-03-07

## 2023-03-07 NOTE — PATIENT INSTRUCTIONS
[facility]  Phone: [phone]  Fax: [phone]    You will be scheduled for surgery with Dr. Israel.   The office will call you with the date and time that surgery is scheduled.  Please take note of these instructions for surgery:  You should have nothing by mouth after midnight the night before your surgery - this includes no food or water.   Your surgery will be cancelled if you have taken anything by mouth after midnight, NO exceptions.   You will need to have a history and physical prior to your surgery. This will need to be completed up to 30 days before your surgery. This H/P can be completed by your family doctor or the hospital.   IF you take coumadin (warfarin), please stop taking this medication 5 days prior to your surgery.   IF you take plavix, please stop taking this 7 days prior to your surgery.   Please contact our office if you have a pacemaker or defibrillator.  IF you are allergic to latex, please tell our office prior to your surgery. This is important in know before scheduling your surgery.  IF you are having an out patient surgery, you will need someone available to drive you home after your surgery, and to also stay with you for the rest of the day.   IF you are having a surgery requiring an inpatient stay in the hospital, you will need someone to drive you home upon discharge from the hospital.  Please contact Dr. Israel's assistant Ngoc if you have any questions or concerns.  Please call the office with any changes in your symptoms or further questions/concerns. [phone]

## 2023-03-07 NOTE — PROGRESS NOTES
Department of General Surgery Consult    PATIENT NAME: Dariana Yan   YOB: 2000     TODAY'S DATE: 3/7/2023    Reason for Consult:  Left inguinal lymphadenopathy    Chief Complaint: Left inguinal pain/lump    Requesting Physician:  Landy Marrero MD    HISTORY OF PRESENT ILLNESS:              The patient is a 26 y.o. male who presents with concerns left inguinal lymph node swelling. Patient notes that around 4 weeks ago he noticed a lump of his left inguinal region leading to extreme tenderness and pain. Went to the ED, and CT scan found swollen lymph nodes around 3-4 cm in size. Had testicular US done which was unremarkable. Referred to [facility] who are needed an excisional biopsy performed to help diagnose lymph node. Patient notes the lymph node has decreased a bit in size since his ED visit in February, and has been taking some anti-inflammatories and antibiotics. Notes he sometimes will have pains that shoot down his leg. Denies systemic symptoms including fever, chills, weight gain, weight loss, fatigue, nausea, vomiting, diarrhea    Past Medical History:        Diagnosis Date    Fractures        Past Surgical History:        Procedure Laterality Date    WISDOM TOOTH EXTRACTION         Current Medications:   No current facility-administered medications for this visit. Prior to Admission medications    Medication Sig Start Date End Date Taking? 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     REVIEW OF SYSTEMS:  CONSTITUTIONAL:  negative  HEENT:  negative  RESPIRATORY:  negative  CARDIOVASCULAR:  negative  GASTROINTESTINAL:  negative  GENITOURINARY:  negative  HEMATOLOGIC/LYMPHATIC:  negative  NEUROLOGICAL:  Negative  * All other ROS reviewed and negative. PHYSICAL EXAM:  VITALS:  BP (!) 131/91 (Site: Right Wrist, Position: Sitting, Cuff Size: Medium Adult)   Pulse (!) 120   Ht 5' 9.02\" (1.753 m)   Wt 246 lb 3.2 oz (111.7 kg)   BMI 36.34 kg/m²   24HR INTAKE/OUTPUT:    [unfilled]  [unfilled]      CONSTITUTIONAL:  alert, no apparent distress and moderately obese  EYES:  PERRL, sclera clear  ENT:  Normocephalic,atraumatic, without obvious abnormality  NECK:  supple, symmetrical, trachea midline  LUNGS: Resp effort easy and unlabored, clear to auscultation  CARDIOVASCULAR:  NO JVD, regular rate and rhythm and no murmur noted  ABDOMEN:  no scars, normal bowel sounds, soft, non-distended, non-tender, involuntary guarding absent, no masses palpated and hernia absent  MUSCULOSKELETAL: No clubbing or cyanosis, 0+ pitting edema lower extremities  NEUROLOGIC:  Mental Status Exam:  Level of Alertness:   awake  PSYCHIATRIC:   person, place, time  SKIN:  Lymphadenopathy noted in left inguinal region, about 1-2cm lymph node palpable, firm, mobile    DATA:    CBC:   Recent Labs     03/05/23  0813   WBC 14.8*   HGB 15.5   HCT 47.3        BMP:    Recent Labs     03/05/23  0813   *   K 4.6   CL 98*   CO2 25   BUN 15   CREATININE 1.0   GLUCOSE 102*     Hepatic:   Recent Labs     03/05/23  0813   AST 27   ALT 30   BILITOT 0.5   ALKPHOS 64     Mag:    No results for input(s): MG in the last 72 hours. Phos:   No results for input(s): PHOS in the last 72 hours. INR: No results for input(s): INR in the last 72 hours. Radiology Review: Images personally reviewed by me. CT Abdomen Pelvis W IV Contrast 2/15/23  1.   Prominent left inguinal lymphadenopathy with visualization mildly   prominent retroperitoneal/left pelvic lymph nodes. While   infectious/inflammatory processes are in the differential diagnosis, other   etiologies including neoplastic abnormality such as lymphoma also in the   differential diagnosis. 2.  No evidence of bowel obstruction, intraperitoneal free air, or abscess. No radiographic finding to suggest presence of appendicitis. CT Abdomen Pelvis W IV Contrast 3/5/23  No acute abdominopelvic abnormality. US Scrotum and Testicles  The testicles are unremarkable in appearance. IMPRESSION/RECOMMENDATIONS:    Inguinal lymphadenopathy  - Will schedule for excisional lymph node biopsy  - Patient consent obtained, advised about the risks, benefits, and alternatives to the procedure    Electronically signed by Francisco Feldman DO  PGY1 Family Medicine Resident  [facility]    Surgery Staff    I have examined this patient, and read and agree with the note by Francisco Feldman DO from today; more than half of the total time was spent by me on the encounter. Palpable lymphadenopathy just below the left inguinal crease. I will plan for open incisional vs excisional biopsy of the node for definitive diagnosis. The technical aspects, risks and complications of the procedure (including bleeding, infection, lymphocele) were reviewed with patient and family. They appear to understand, ask appropriate questions, and agree to proceed.     Alicia Gutierrez MD

## 2023-03-09 ENCOUNTER — TELEPHONE (OUTPATIENT)
Dept: SURGERY | Age: 23
End: 2023-03-09

## 2023-03-09 NOTE — PROGRESS NOTES
William Olive    Age 26 y.o.    male    2000    MRN [id number]    3/29/2023  Arrival Time_____________  OR Time____________[address]     Procedure(s):  LEFT INGUINAL LYMPH NODE BIOPSY                      General   Surgeon(s):  Beckie Camp, MD      DAY ADMIT ___  SDS/OP ___  OUTPT IN BED ___        Phone [phone] (home)     PCP _____________________ Phone_________________ Epic ( ) Epic CE ( ) Appt ________    ADDITIONAL INFO __________________________________ Cardio/Consult _____________    NOTES _____________________________________________________________________    ____________________________________________________________________________    PAT APPT DATE:________ TIME: ________  FAXED QAD: _______  (__) H&P w/ Hospitalist  __________________________________________________________________________  Preop Nurse phone screen complete: _____________  (__) CBC     (__) W/ DIFF ___________     (__) Hgb A1C    ___________  (__) CHEST X RAY   __________  (__) LIPID PROFILE  ___________  (__) EKG   __________  (__) PT/PTT   ___________  (__) PFT's   __________  (__) BMP   ___________  (__) CAROTIDS  __________  (__) CMP   ___________  (__) VEIN MAPPING  __________  (__) U/A   ___________  (__) HISTORY & PHYSICAL __________  (__) URINE C & S  ___________  (__) CARDIAC CLEARANCE __________  (__) U/A W/ FLEX  ___________  (__) PULM.  CLEARANCE __________  (__) SERUM PREGNANCY ___________  (__) Check Epic DOS orders __________  (__) TYPE & SCREEN __________repeat ( ) (__)  __________________ __________  (__) ALBUMIN   ___________  (__)  __________________ __________  (__) TRANSFERRIN  ___________  (__)  __________________ __________  (__) LIVER PROFILE  ___________  (__)  __________________ __________  (__) MRSA NASAL SWAB ___________  (__) URINE PREG DOS __________  (__) SED RATE  ___________  (__) BLOOD SUGAR DOS __________  (__) C-REACTIVE PROTEIN ___________    (__) VITAMIN D HYDROXY ___________  (__) BLOOD

## 2023-03-09 NOTE — TELEPHONE ENCOUNTER
Pt saw Dr Tyson Humphreys in the office 3/7/23 and was given surgery instructions for a Left Inguinal Lymph Node Biopsy (General Anes) scheduled 3/29/23 @ 1pm arrival 11am [facility] - NPO after midnight bro[facility]nn b/f surgery - Pt will need  day of surgery - Dr Silvestre Record to complete pre-op physical - Pt understood and agreed w/ above noted

## 2023-03-23 RX ORDER — RIZATRIPTAN BENZOATE 10 MG/1
10 TABLET ORAL
COMMUNITY

## 2023-03-23 NOTE — PROGRESS NOTES
1. Do not eat or drink anything after 12 midnight prior to surgery. This includes no water, chewing gum mints, or ice chips. You may brush your teeth and gargle the day of surgery but DO NOT SWALLOW THE WATER. 2. Please see your family doctor/pediatrician for a history and physical and/or concerning medications. Bring any test results/reports from your physician's office. If you are under the care of a heart doctor or specialist please be aware that you may be asked to see him or her for clearance. 3. You may be asked to stop blood thinners such as Coumadin, Plavix, Fragmin, and Lovenox or Anti-inflammatories such as Aspirin, Ibuprofen, Advil, and Naproxen prior to your surgery. Please check with your doctor before stopping these or any other medications. 4. Do not smoke, and do not drink any alcoholic beverages 24 hours prior to surgery. 5. You MUST make arrangements for a responsible adult to take you home after your surgery. For your safety, you will not be allowed to leave alone or drive yourself home. Your surgery will be cancelled if you do not have a ride home. Also for your safety, it is strongly suggested someone stay with you the first 24 hrs after your surgery. 6. A parent/legal guardian must accompany a child scheduled for surgery and plan to stay at the hospital until the child is discharged. Please do not bring other children with you. 7. For your comfort,please wear simple, loose fitting clothing to the hospital.  Please do not bring valuables (money, credit cards, checkbooks, etc.) Do not wear any makeup (including no eye makeup) or nail polish on your fingers or toes. 8. For your safety, please DO NOT wear any jewelry or piercings on day of surgery. All body piercing jewelry must be removed. 9. If you have dentures, they will be removed before going to the OR; for your convenience we will provide you with a container.   If you wear contact lenses or glasses, they will be removed, they will be removed, please bring a case for them. 10. If appicable,Please see your family doctor/pediatrician for a history & physical and/or concerning medications. Bring any test results/reports from your physician's office. 11. Remember to bring Blood Bank bracelet to the hospital on the day of surgery. 12. If you have a Living Will and Durable Power of  for Healthcare, please bring in a copy. 15. Notify your Surgeon if you develop any illness between now and surgery  time, cough, cold, fever, sore throat, nausea, vomiting, etc.  Please notify your surgeon if you experience dizziness, shortness of breath or blurred vision between now & the time of your surgery   14. DO NOT shave your operative site 96 hours prior to surgery. For face & neck surgery, men may use an electric razor 48 hours prior to surgery. 15. Shower the night before surgery with _X__Antibacterial soap ___Hibiclens   16. To provide excellent care visitors will be limited to one in the room at any given time. 17.  Please bring picture ID and insurance card. 18.  Visit our web site for additional information:  [email or web address].

## 2023-03-28 ENCOUNTER — ANESTHESIA EVENT (OUTPATIENT)
Dept: OPERATING ROOM | Age: 23
End: 2023-03-28
Payer: COMMERCIAL

## 2023-03-29 ENCOUNTER — ANESTHESIA (OUTPATIENT)
Dept: OPERATING ROOM | Age: 23
End: 2023-03-29
Payer: COMMERCIAL

## 2023-03-29 ENCOUNTER — HOSPITAL ENCOUNTER (OUTPATIENT)
Age: 23
Setting detail: OUTPATIENT SURGERY
Discharge: HOME | End: 2023-03-29
Attending: SURGERY | Admitting: SURGERY
Payer: COMMERCIAL

## 2023-03-29 VITALS
BODY MASS INDEX: 36.43 KG/M2 | HEIGHT: 69 IN | RESPIRATION RATE: 8 BRPM | DIASTOLIC BLOOD PRESSURE: 61 MMHG | SYSTOLIC BLOOD PRESSURE: 131 MMHG | WEIGHT: 246 LBS | HEART RATE: 59 BPM | OXYGEN SATURATION: 95 % | TEMPERATURE: 97.5 F

## 2023-03-29 DIAGNOSIS — R59.0 INGUINAL LYMPHADENOPATHY: ICD-10-CM

## 2023-03-29 DIAGNOSIS — G89.18 POSTOPERATIVE PAIN: Primary | ICD-10-CM

## 2023-03-29 PROCEDURE — 2500000003 HC RX 250 WO HCPCS: Performed by: SURGERY

## 2023-03-29 PROCEDURE — 3700000000 HC ANESTHESIA ATTENDED CARE: Performed by: SURGERY

## 2023-03-29 PROCEDURE — 7100000011 HC PHASE II RECOVERY - ADDTL 15 MIN: Performed by: SURGERY

## 2023-03-29 PROCEDURE — 3700000001 HC ADD 15 MINUTES (ANESTHESIA): Performed by: SURGERY

## 2023-03-29 PROCEDURE — 7100000001 HC PACU RECOVERY - ADDTL 15 MIN: Performed by: SURGERY

## 2023-03-29 PROCEDURE — 7100000010 HC PHASE II RECOVERY - FIRST 15 MIN: Performed by: SURGERY

## 2023-03-29 PROCEDURE — 7100000000 HC PACU RECOVERY - FIRST 15 MIN: Performed by: SURGERY

## 2023-03-29 PROCEDURE — 2709999900 HC NON-CHARGEABLE SUPPLY: Performed by: SURGERY

## 2023-03-29 PROCEDURE — 6360000002 HC RX W HCPCS: Performed by: SURGERY

## 2023-03-29 PROCEDURE — 38500 BIOPSY/REMOVAL LYMPH NODES: CPT | Performed by: SURGERY

## 2023-03-29 PROCEDURE — 88185 FLOWCYTOMETRY/TC ADD-ON: CPT

## 2023-03-29 PROCEDURE — 88312 SPECIAL STAINS GROUP 1: CPT

## 2023-03-29 PROCEDURE — 6370000000 HC RX 637 (ALT 250 FOR IP): Performed by: ANESTHESIOLOGY

## 2023-03-29 PROCEDURE — 6360000002 HC RX W HCPCS: Performed by: NURSE ANESTHETIST, CERTIFIED REGISTERED

## 2023-03-29 PROCEDURE — 2500000003 HC RX 250 WO HCPCS: Performed by: NURSE ANESTHETIST, CERTIFIED REGISTERED

## 2023-03-29 PROCEDURE — 3600000012 HC SURGERY LEVEL 2 ADDTL 15MIN: Performed by: SURGERY

## 2023-03-29 PROCEDURE — 88307 TISSUE EXAM BY PATHOLOGIST: CPT

## 2023-03-29 PROCEDURE — 2580000003 HC RX 258: Performed by: ANESTHESIOLOGY

## 2023-03-29 PROCEDURE — 3600000002 HC SURGERY LEVEL 2 BASE: Performed by: SURGERY

## 2023-03-29 PROCEDURE — 88184 FLOWCYTOMETRY/ TC 1 MARKER: CPT

## 2023-03-29 RX ORDER — SODIUM CHLORIDE, SODIUM LACTATE, POTASSIUM CHLORIDE, CALCIUM CHLORIDE 600; 310; 30; 20 MG/100ML; MG/100ML; MG/100ML; MG/100ML
INJECTION, SOLUTION INTRAVENOUS CONTINUOUS
Status: DISCONTINUED | OUTPATIENT
Start: 2023-03-29 | End: 2023-03-29 | Stop reason: HOSPADM

## 2023-03-29 RX ORDER — OXYCODONE HYDROCHLORIDE 5 MG/1
5 TABLET ORAL PRN
Status: DISCONTINUED | OUTPATIENT
Start: 2023-03-29 | End: 2023-03-29 | Stop reason: HOSPADM

## 2023-03-29 RX ORDER — CEFAZOLIN SODIUM IN 0.9 % NACL 2 G/100 ML
2000 PLASTIC BAG, INJECTION (ML) INTRAVENOUS
Status: COMPLETED | OUTPATIENT
Start: 2023-03-29 | End: 2023-03-29

## 2023-03-29 RX ORDER — OXYCODONE HYDROCHLORIDE AND ACETAMINOPHEN 5; 325 MG/1; MG/1
1 TABLET ORAL EVERY 4 HOURS PRN
Qty: 10 TABLET | Refills: 0 | Status: SHIPPED | OUTPATIENT
Start: 2023-03-29 | End: 2023-04-01

## 2023-03-29 RX ORDER — PHENYLEPHRINE HCL IN 0.9% NACL 1 MG/10 ML
SYRINGE (ML) INTRAVENOUS PRN
Status: DISCONTINUED | OUTPATIENT
Start: 2023-03-29 | End: 2023-03-29 | Stop reason: SDUPTHER

## 2023-03-29 RX ORDER — LABETALOL HYDROCHLORIDE 5 MG/ML
10 INJECTION, SOLUTION INTRAVENOUS
Status: DISCONTINUED | OUTPATIENT
Start: 2023-03-29 | End: 2023-03-29 | Stop reason: HOSPADM

## 2023-03-29 RX ORDER — LIDOCAINE HYDROCHLORIDE 10 MG/ML
0.3 INJECTION, SOLUTION EPIDURAL; INFILTRATION; INTRACAUDAL; PERINEURAL
Status: DISCONTINUED | OUTPATIENT
Start: 2023-03-29 | End: 2023-03-29 | Stop reason: HOSPADM

## 2023-03-29 RX ORDER — LIDOCAINE HYDROCHLORIDE 20 MG/ML
INJECTION, SOLUTION EPIDURAL; INFILTRATION; INTRACAUDAL; PERINEURAL PRN
Status: DISCONTINUED | OUTPATIENT
Start: 2023-03-29 | End: 2023-03-29 | Stop reason: SDUPTHER

## 2023-03-29 RX ORDER — SODIUM CHLORIDE 0.9 % (FLUSH) 0.9 %
5-40 SYRINGE (ML) INJECTION PRN
Status: DISCONTINUED | OUTPATIENT
Start: 2023-03-29 | End: 2023-03-29 | Stop reason: HOSPADM

## 2023-03-29 RX ORDER — OXYCODONE HYDROCHLORIDE 5 MG/1
10 TABLET ORAL PRN
Status: DISCONTINUED | OUTPATIENT
Start: 2023-03-29 | End: 2023-03-29 | Stop reason: HOSPADM

## 2023-03-29 RX ORDER — SODIUM CHLORIDE 0.9 % (FLUSH) 0.9 %
5-40 SYRINGE (ML) INJECTION EVERY 12 HOURS SCHEDULED
Status: DISCONTINUED | OUTPATIENT
Start: 2023-03-29 | End: 2023-03-29 | Stop reason: HOSPADM

## 2023-03-29 RX ORDER — FENTANYL CITRATE 50 UG/ML
INJECTION, SOLUTION INTRAMUSCULAR; INTRAVENOUS PRN
Status: DISCONTINUED | OUTPATIENT
Start: 2023-03-29 | End: 2023-03-29 | Stop reason: SDUPTHER

## 2023-03-29 RX ORDER — SODIUM CHLORIDE 9 MG/ML
INJECTION, SOLUTION INTRAVENOUS PRN
Status: DISCONTINUED | OUTPATIENT
Start: 2023-03-29 | End: 2023-03-29 | Stop reason: HOSPADM

## 2023-03-29 RX ORDER — BUPIVACAINE HYDROCHLORIDE 5 MG/ML
INJECTION, SOLUTION EPIDURAL; INTRACAUDAL
Status: COMPLETED | OUTPATIENT
Start: 2023-03-29 | End: 2023-03-29

## 2023-03-29 RX ORDER — PROPOFOL 10 MG/ML
INJECTION, EMULSION INTRAVENOUS PRN
Status: DISCONTINUED | OUTPATIENT
Start: 2023-03-29 | End: 2023-03-29 | Stop reason: SDUPTHER

## 2023-03-29 RX ORDER — ONDANSETRON 2 MG/ML
4 INJECTION INTRAMUSCULAR; INTRAVENOUS EVERY 30 MIN PRN
Status: DISCONTINUED | OUTPATIENT
Start: 2023-03-29 | End: 2023-03-29 | Stop reason: HOSPADM

## 2023-03-29 RX ORDER — DEXAMETHASONE SODIUM PHOSPHATE 4 MG/ML
INJECTION, SOLUTION INTRA-ARTICULAR; INTRALESIONAL; INTRAMUSCULAR; INTRAVENOUS; SOFT TISSUE PRN
Status: DISCONTINUED | OUTPATIENT
Start: 2023-03-29 | End: 2023-03-29 | Stop reason: SDUPTHER

## 2023-03-29 RX ORDER — MEPERIDINE HYDROCHLORIDE 50 MG/ML
12.5 INJECTION INTRAMUSCULAR; INTRAVENOUS; SUBCUTANEOUS EVERY 5 MIN PRN
Status: DISCONTINUED | OUTPATIENT
Start: 2023-03-29 | End: 2023-03-29 | Stop reason: HOSPADM

## 2023-03-29 RX ORDER — LIDOCAINE HYDROCHLORIDE 10 MG/ML
INJECTION, SOLUTION INFILTRATION; PERINEURAL
Status: COMPLETED | OUTPATIENT
Start: 2023-03-29 | End: 2023-03-29

## 2023-03-29 RX ORDER — ONDANSETRON 2 MG/ML
INJECTION INTRAMUSCULAR; INTRAVENOUS PRN
Status: DISCONTINUED | OUTPATIENT
Start: 2023-03-29 | End: 2023-03-29 | Stop reason: SDUPTHER

## 2023-03-29 RX ADMIN — SODIUM CHLORIDE, SODIUM LACTATE, POTASSIUM CHLORIDE, AND CALCIUM CHLORIDE: .6; .31; .03; .02 INJECTION, SOLUTION INTRAVENOUS at 15:35

## 2023-03-29 RX ADMIN — Medication 40 MCG: at 16:10

## 2023-03-29 RX ADMIN — LIDOCAINE HYDROCHLORIDE 100 MG: 20 INJECTION, SOLUTION EPIDURAL; INFILTRATION; INTRACAUDAL at 15:36

## 2023-03-29 RX ADMIN — Medication 2000 MG: at 15:40

## 2023-03-29 RX ADMIN — Medication 20 MCG: at 16:13

## 2023-03-29 RX ADMIN — OXYCODONE 5 MG: 5 TABLET ORAL at 16:47

## 2023-03-29 RX ADMIN — ONDANSETRON 4 MG: 2 INJECTION INTRAMUSCULAR; INTRAVENOUS at 15:36

## 2023-03-29 RX ADMIN — DEXAMETHASONE SODIUM PHOSPHATE 8 MG: 4 INJECTION, SOLUTION INTRAMUSCULAR; INTRAVENOUS at 15:36

## 2023-03-29 RX ADMIN — PROPOFOL 200 MG: 10 INJECTION, EMULSION INTRAVENOUS at 15:36

## 2023-03-29 RX ADMIN — FENTANYL CITRATE 100 MCG: 50 INJECTION, SOLUTION INTRAMUSCULAR; INTRAVENOUS at 15:35

## 2023-03-29 RX ADMIN — PROPOFOL 100 MG: 10 INJECTION, EMULSION INTRAVENOUS at 15:37

## 2023-03-29 ASSESSMENT — PAIN - FUNCTIONAL ASSESSMENT: PAIN_FUNCTIONAL_ASSESSMENT: 0-10

## 2023-03-29 ASSESSMENT — LIFESTYLE VARIABLES: SMOKING_STATUS: 1

## 2023-03-29 NOTE — ANESTHESIA POSTPROCEDURE EVALUATION
Department of Anesthesiology  Postprocedure Note    Patient: Wendi Merrill  MRN: [id number]  [place]: 2000  Date of evaluation: 3/29/2023      Procedure Summary     Date: 03/29/23 Room / Location: [place] [address] 01 / [facility]    Anesthesia Start: [facility] Anesthesia Stop: 1628    Procedure: LEFT INGUINAL LYMPH NODE BIOPSY (Left: Groin) Diagnosis:       Inguinal lymphadenopathy      (Inguinal lymphadenopathy [R59.0])    Surgeons: Kyara Izqueirdo MD Responsible Provider: Danish Mckee DO    Anesthesia Type: general ASA Status: 2          Anesthesia Type: No value filed.     Cornelio Phase I: Cornelio Score: 9    Cornelio Phase II:        Anesthesia Post Evaluation    Patient location during evaluation: PACU  Patient participation: complete - patient participated  Level of consciousness: awake and alert  Pain score: 0  Airway patency: patent  Nausea & Vomiting: no nausea and no vomiting  Complications: no  Cardiovascular status: hemodynamically stable  Respiratory status: acceptable  Hydration status: stable

## 2023-03-29 NOTE — ANESTHESIA PRE PROCEDURE
Department of Anesthesiology  Preprocedure Note       Name:  Nadya Nam   Age:  26 y.o.  :  2000                                          MRN:  [id number]         Date:  3/29/2023      Surgeon: José Miguel Zhu):  Amanda George MD    Procedure: Procedure(s):  LEFT INGUINAL LYMPH NODE BIOPSY    Medications prior to admission:   Prior to Admission medications    Medication Sig Start Date End Date Taking? Authorizing Provider   rizatriptan (MAXALT) 10 MG tablet Take 10 mg by mouth once as needed for Migraine May repeat in 2 hours if needed   Yes Historical Provider, MD       Current medications:    Current Facility-Administered Medications   Medication Dose Route Frequency Provider Last Rate Last Admin    lidocaine PF 1 % injection 0.3 mL  0.3 mL IntraDERmal Once PRN Jaja Cancino MD        lactated ringers IV soln infusion   IntraVENous Continuous Jaja Cancino MD        sodium chloride flush 0.9 % injection 5-40 mL  5-40 mL IntraVENous 2 times per day Jaja Cancino MD        sodium chloride flush 0.9 % injection 5-40 mL  5-40 mL IntraVENous PRN Jaja Cancino MD        0.9 % sodium chloride infusion   IntraVENous PRN Jaja Cancino MD        sodium chloride flush 0.9 % injection 5-40 mL  5-40 mL IntraVENous 2 times per day Amanda George MD        sodium chloride flush 0.9 % injection 5-40 mL  5-40 mL IntraVENous PRN Amanda George MD        0.9 % sodium chloride infusion   IntraVENous PRN Amanda George MD        ceFAZolin (ANCEF) 2000 mg in 0.9% sodium chloride 100 mL IVPB  2,000 mg IntraVENous On Call to [address] [place] [address], MD           Allergies:     Allergies   Allergen Reactions    Amoxicillin Rash       Problem List:    Patient Active Problem List   Diagnosis Code    Closed fracture of distal phalanx or phalanges of hand S62.639A    Lymphadenopathy, inguinal R59.0       Past Medical History:        Diagnosis Date    Closed compression fracture of fifth lumbar vertebra ([address]

## 2023-03-29 NOTE — DISCHARGE INSTRUCTIONS
[facility] SURGERY [facility]. Scotty Medina M.D. [address] [place] [address] [place]                [address]olfo Barragan M.D. [address] [facility], [address] [place] [address]         [place], [address] [facility] Pablo García M.D                         [phone]        [facility] Mai TTatyana Kitchen M.D. [address] [facility]       POST-OPERATIVE INSTRUCTIONS FOLLOWING EXCISION OF A MASS    Call the office to schedule your post-operative appointment with your surgeon for two (2) weeks. You will have surgical glue closing your incisions. .     You may shower. Wash incisions gently, and pat them dry. Do not rub your incisions. If you have packing in your wound, this usually will require daily dressing changes. Follow the instructions given to you at the hospital.     Perlafelipedee Aniyah will have pain medicine ordered. Take as directed. Do NOT drive for one week and while taking your narcotic pain medicine.      Watch for signs of infection:       Fever over 100.5°     Excessive warmth, or bright redness around your incision    Leakage of bloody or cloudy fluid from you incisions

## 2023-03-29 NOTE — PROGRESS NOTES
Patient arrived to PACU bay 6, placed on bedside monitor. VSS. Report obtained from OR RN and anesthesia.

## 2023-03-29 NOTE — BRIEF OP NOTE
Brief Postoperative Note      Patient: Candelaria López  YOB: 2000  MRN: [id number]    Date of Procedure: 3/29/2023    Pre-Op Diagnosis: Inguinal lymphadenopathy [R59.0]    Post-Op Diagnosis: Same       Procedure(s):  LEFT INGUINAL LYMPH NODE BIOPSY    Surgeon(s):  Eda Enriquez MD    Assistant:  Surgical Assistant: Danette Redding    Anesthesia: General    Estimated Blood Loss (mL): less than 50     Complications: None    Specimens:   ID Type Source Tests Collected by Time Destination   A : LEFT INGUINAL LYMPH NODE BIOPSY Tissue Tissue SURGICAL PATHOLOGY Eda Enriquez MD 3/29/2023 1556        Implants:  * No implants in log *      Drains: * No LDAs found *    Findings: ~1x2cm superficial lymph node just below left inguinal ligament, excised    Job#: [id number]    Electronically signed by Iglesia Falk MD on 3/30/2023 at 3:25 PM

## 2023-03-29 NOTE — H&P
I have reviewed the history and physical and examined the patient. I find no relevant changes. I have reviewed with the patient and/or family members, during the preoperative office visit the risks, benefits, and alternatives to the procedure.     Tomasa Velasquez MD

## 2023-03-31 NOTE — OP NOTE
[facility] [address], [address]                                OPERATIVE REPORT    PATIENT NAME: Mimi Alejandro                       :        2000  MED REC NO:   [id number]                          ROOM:  ACCOUNT NO:   [de-identified]                           ADMIT DATE: 2023  PROVIDER:     Ford Clayton MD    DATE OF PROCEDURE:  2023    PREOPERATIVE DIAGNOSIS:  Left inguinal lymphadenopathy. POSTOPERATIVE DIAGNOSIS:  Left inguinal lymphadenopathy. OPERATION PERFORMED:  Left inguinal lymph node biopsy. SURGEON:  Ford Clayton MD    ANESTHESIA:  General.    ESTIMATED BLOOD LOSS:  Less than 50 mL. SPECIMEN:  Left superficial inguinal lymph node. COUNTS: Sponge and needle count were correct. INDICATIONS:  The patient is a 31-year-old male who recently presented  with acute left groin pain and a palpable mass. He had an initial CT  scan done which showed a large lymph node at the inguinal crease area  overlying the femoral vessels. Again, a repeat CT scan several weeks  later which showed a persistent enlarged node, but it was smaller than  the prior exam.  The patient was seen by Oncology and recommended tissue  sampling of the lymph node to ensure no concerning pathology was  present. FINDINGS:  Approximately 1 x 2 cm superficial lymph node just below the  left inguinal ligament, excised fully. DESCRIPTION OF PROCEDURE:  After informed consent was obtained, the  patient was taken to the operating room and placed in the supine  position. We both confirmed a vaguely palpable smaller mass in the left  groin area just below the groin crease overlying the femoral vessels on  the proximal thigh. The patient was placed in the supine position and  general anesthesia was administered. The left groin and thigh were  prepped and draped in sterile fashion.   I made a 3 cm transverse  incision over the area of the palpable mass after infiltrating with  local anesthesia. We then dissected down through the subcutaneous fat  and the superficial fascia until I eventually encountered a visible and  palpable lymph node. While the lymph node was somewhat more firm in  texture, it did not look otherwise suspicious in its appearance. We now  began to gradually and carefully dissect circumferentially around the  node using a combination of cautery and as needed clips placed onto the  attachments to the lymph node. Gradually, the lymph node was mobilized  up out of the field. I then ligated the superior and inferior poles of  the lymph node with Vicryl sutures to complete the removal of the node. The node was sent for appropriate histology. The wound was checked for  hemostasis, which was excellent. The fascia was reapproximated with 3-0  Vicryl figure-of-eight suture. The skin incisions were then closed with  3-0 Vicryl subcutaneous sutures followed by a 4-0 Monocryl subcuticular  suture and Dermabond. The patient was then extubated and taken to the  recovery room in stable condition.         Alannah Begum MD    D: 03/30/2023 15:25:43       T: 03/30/2023 21:35:34     [id number]  Job#: [id number]     Doc#: [id number]    CC:  Gab Guardado MD

## 2023-05-21 ENCOUNTER — HOSPITAL ENCOUNTER (EMERGENCY)
Age: 23
Discharge: HOME | End: 2023-05-21
Payer: COMMERCIAL

## 2023-05-21 VITALS
DIASTOLIC BLOOD PRESSURE: 85 MMHG | OXYGEN SATURATION: 98 % | HEIGHT: 69 IN | RESPIRATION RATE: 18 BRPM | SYSTOLIC BLOOD PRESSURE: 159 MMHG | BODY MASS INDEX: 34.8 KG/M2 | TEMPERATURE: 98.2 F | WEIGHT: 235 LBS | HEART RATE: 92 BPM

## 2023-05-21 DIAGNOSIS — G89.29 ACUTE EXACERBATION OF CHRONIC LOW BACK PAIN: Primary | ICD-10-CM

## 2023-05-21 DIAGNOSIS — M54.50 ACUTE EXACERBATION OF CHRONIC LOW BACK PAIN: Primary | ICD-10-CM

## 2023-05-21 PROCEDURE — 6370000000 HC RX 637 (ALT 250 FOR IP): Performed by: PHYSICIAN ASSISTANT

## 2023-05-21 PROCEDURE — 99283 EMERGENCY DEPT VISIT LOW MDM: CPT

## 2023-05-21 RX ORDER — ONDANSETRON 4 MG/1
4 TABLET, ORALLY DISINTEGRATING ORAL ONCE
Status: COMPLETED | OUTPATIENT
Start: 2023-05-21 | End: 2023-05-21

## 2023-05-21 RX ORDER — GABAPENTIN 100 MG/1
100 CAPSULE ORAL 2 TIMES DAILY
Qty: 10 CAPSULE | Refills: 0 | Status: SHIPPED | OUTPATIENT
Start: 2023-05-21 | End: 2023-05-26

## 2023-05-21 RX ORDER — HYDROCODONE BITARTRATE AND ACETAMINOPHEN 5; 325 MG/1; MG/1
1 TABLET ORAL ONCE
Status: COMPLETED | OUTPATIENT
Start: 2023-05-21 | End: 2023-05-21

## 2023-05-21 RX ORDER — ACETAMINOPHEN 325 MG/1
650 TABLET ORAL ONCE
Status: COMPLETED | OUTPATIENT
Start: 2023-05-21 | End: 2023-05-21

## 2023-05-21 RX ORDER — MORPHINE SULFATE 15 MG/1
15 TABLET ORAL
Qty: 1 TABLET | Refills: 0 | Status: SHIPPED | OUTPATIENT
Start: 2023-05-21 | End: 2023-05-22

## 2023-05-21 RX ORDER — IBUPROFEN 800 MG/1
800 TABLET ORAL ONCE
Status: COMPLETED | OUTPATIENT
Start: 2023-05-21 | End: 2023-05-21

## 2023-05-21 RX ADMIN — ONDANSETRON 4 MG: 4 TABLET, ORALLY DISINTEGRATING ORAL at 01:14

## 2023-05-21 RX ADMIN — HYDROCODONE BITARTRATE AND ACETAMINOPHEN 1 TABLET: 5; 325 TABLET ORAL at 01:14

## 2023-05-21 RX ADMIN — ACETAMINOPHEN 650 MG: 325 TABLET ORAL at 01:14

## 2023-05-21 RX ADMIN — IBUPROFEN 800 MG: 800 TABLET, FILM COATED ORAL at 01:14

## 2023-05-21 ASSESSMENT — PAIN DESCRIPTION - LOCATION: LOCATION: BACK

## 2023-05-21 ASSESSMENT — PAIN DESCRIPTION - ORIENTATION: ORIENTATION: MID

## 2023-05-21 ASSESSMENT — PAIN SCALES - GENERAL: PAINLEVEL_OUTOF10: 10

## 2023-05-21 ASSESSMENT — PAIN - FUNCTIONAL ASSESSMENT: PAIN_FUNCTIONAL_ASSESSMENT: 0-10

## 2023-05-21 ASSESSMENT — PAIN DESCRIPTION - DESCRIPTORS: DESCRIPTORS: ACHING

## 2024-02-12 ENCOUNTER — HOSPITAL ENCOUNTER (EMERGENCY)
Age: 24
Discharge: HOME | End: 2024-02-12
Payer: COMMERCIAL

## 2024-02-12 VITALS
SYSTOLIC BLOOD PRESSURE: 141 MMHG | OXYGEN SATURATION: 97 % | DIASTOLIC BLOOD PRESSURE: 85 MMHG | TEMPERATURE: 97.9 F | RESPIRATION RATE: 16 BRPM | HEART RATE: 80 BPM

## 2024-02-12 DIAGNOSIS — J02.9 ACUTE PHARYNGITIS, UNSPECIFIED ETIOLOGY: Primary | ICD-10-CM

## 2024-02-12 LAB
FLUAV RNA RESP QL NAA+PROBE: NOT DETECTED
FLUBV RNA RESP QL NAA+PROBE: NOT DETECTED
S PYO AG THROAT QL: NEGATIVE
SARS-COV-2 RNA RESP QL NAA+PROBE: NOT DETECTED

## 2024-02-12 PROCEDURE — 87636 SARSCOV2 & INF A&B AMP PRB: CPT

## 2024-02-12 PROCEDURE — 6360000002 HC RX W HCPCS: Performed by: NURSE PRACTITIONER

## 2024-02-12 PROCEDURE — 99283 EMERGENCY DEPT VISIT LOW MDM: CPT

## 2024-02-12 PROCEDURE — 87081 CULTURE SCREEN ONLY: CPT

## 2024-02-12 PROCEDURE — 87880 STREP A ASSAY W/OPTIC: CPT

## 2024-02-12 RX ORDER — DEXAMETHASONE SODIUM PHOSPHATE 4 MG/ML
10 INJECTION, SOLUTION INTRA-ARTICULAR; INTRALESIONAL; INTRAMUSCULAR; INTRAVENOUS; SOFT TISSUE ONCE
Status: COMPLETED | OUTPATIENT
Start: 2024-02-12 | End: 2024-02-12

## 2024-02-12 RX ADMIN — DEXAMETHASONE SODIUM PHOSPHATE 10 MG: 4 INJECTION, SOLUTION INTRAMUSCULAR; INTRAVENOUS at 22:43

## 2024-02-14 LAB — S PYO THROAT QL CULT: NORMAL

## 2024-02-15 NOTE — ED PROVIDER NOTES
[facility]  ED  EMERGENCY DEPARTMENT ENCOUNTER        Pt Name: Shantanu Coronel  MRN: [id number]  Birthdate 2000  Date of evaluation: 2/12/2024  Provider: JACK Ramos - CNP  PCP: Antonio Roger MD        LEO. I have evaluated this patient.        CHIEF COMPLAINT       Chief Complaint   Patient presents with    Sore Throat     Pt to ED for a sore throat.        HISTORY OF PRESENT ILLNESS: 1 or more Elements     History From: the patient  Limitations to history : None    Shantanu Coronel is a 24 y.o. male who presents to the emergency department today with complaints of a sore throat.  Patient had a sore throat for couple days, no difficulty breathing, no chest pain or abdominal pain.  Here for further evaluation.    Nursing Notes were all reviewed and agreed with or any disagreements were addressed in the HPI.    REVIEW OF SYSTEMS :      Review of Systems    Positives and Pertinent negatives as per HPI.     SURGICAL HISTORY     Past Surgical History:   Procedure Laterality Date    LYMPH NODE DISSECTION Left 3/29/2023    LEFT INGUINAL LYMPH NODE BIOPSY performed by John Israel MD at [facility] OR    WISDOM TOOTH EXTRACTION         CURRENTMEDICATIONS       Discharge Medication List as of 2/12/2024 10:40 PM        CONTINUE these medications which have NOT CHANGED    Details   ibuprofen (ADVIL;MOTRIN) 800 MG tablet Take 1 tablet by mouth every 8 hours as needed for Pain, Disp-30 tablet, R-0Normal      gabapentin (NEURONTIN) 100 MG capsule Take 1 capsule by mouth 2 times daily for 5 days. Intended supply: 90 days, Disp-10 capsule, R-0Normal      rizatriptan (MAXALT) 10 MG tablet Take 10 mg by mouth once as needed for Migraine May repeat in 2 hours if neededHistorical Med             ALLERGIES     Amoxicillin    FAMILYHISTORY     History reviewed. No pertinent family history.     SOCIAL HISTORY       Social History     Tobacco Use    Smoking status: Never    Smokeless tobacco: Never   Vaping Use

## (undated) DEVICE — GOWN,REINF,POLY,AURORA,XLNG/XXL,STRL: Brand: MEDLINE

## (undated) DEVICE — Device

## (undated) DEVICE — GLOVE,SURG,SENSICARE SLT,LF,PF,7.5: Brand: MEDLINE

## (undated) DEVICE — SUTURE MCRYL + SZ 4-0 L18IN ABSRB UD L19MM PS-2 3/8 CIR MCP496G

## (undated) DEVICE — APPLIER LIG CLP M L11IN TI STR RNG HNDL FOR 20 CLP DISP

## (undated) DEVICE — SUTURE VCRL + SZ 3-0 L18IN ABSRB UD SH 1/2 CIR TAPERCUT NDL VCP864D

## (undated) DEVICE — ADHESIVE SKIN CLSR 0.7ML TOP DERMBND ADV